# Patient Record
Sex: FEMALE | Race: WHITE | NOT HISPANIC OR LATINO
[De-identification: names, ages, dates, MRNs, and addresses within clinical notes are randomized per-mention and may not be internally consistent; named-entity substitution may affect disease eponyms.]

---

## 2019-05-13 ENCOUNTER — APPOINTMENT (OUTPATIENT)
Dept: ANTEPARTUM | Facility: CLINIC | Age: 29
End: 2019-05-13
Payer: COMMERCIAL

## 2019-05-13 PROCEDURE — 76805 OB US >/= 14 WKS SNGL FETUS: CPT

## 2019-05-13 PROCEDURE — 76817 TRANSVAGINAL US OBSTETRIC: CPT

## 2019-06-18 ENCOUNTER — APPOINTMENT (OUTPATIENT)
Dept: ANTEPARTUM | Facility: CLINIC | Age: 29
End: 2019-06-18
Payer: COMMERCIAL

## 2019-06-18 PROCEDURE — 76817 TRANSVAGINAL US OBSTETRIC: CPT

## 2019-06-18 PROCEDURE — 76816 OB US FOLLOW-UP PER FETUS: CPT

## 2019-08-13 ENCOUNTER — APPOINTMENT (OUTPATIENT)
Dept: ANTEPARTUM | Facility: CLINIC | Age: 29
End: 2019-08-13
Payer: COMMERCIAL

## 2019-08-13 PROCEDURE — 76816 OB US FOLLOW-UP PER FETUS: CPT

## 2019-08-13 PROCEDURE — 76819 FETAL BIOPHYS PROFIL W/O NST: CPT

## 2019-10-01 ENCOUNTER — APPOINTMENT (OUTPATIENT)
Dept: ANTEPARTUM | Facility: CLINIC | Age: 29
End: 2019-10-01

## 2019-10-24 ENCOUNTER — APPOINTMENT (OUTPATIENT)
Dept: ANTEPARTUM | Facility: CLINIC | Age: 29
End: 2019-10-24
Payer: MEDICAID

## 2019-10-24 PROCEDURE — 76819 FETAL BIOPHYS PROFIL W/O NST: CPT

## 2019-10-27 ENCOUNTER — INPATIENT (INPATIENT)
Facility: HOSPITAL | Age: 29
LOS: 1 days | Discharge: HOME | End: 2019-10-29
Attending: OBSTETRICS & GYNECOLOGY | Admitting: OBSTETRICS & GYNECOLOGY
Payer: MEDICAID

## 2019-10-27 VITALS
SYSTOLIC BLOOD PRESSURE: 124 MMHG | RESPIRATION RATE: 18 BRPM | DIASTOLIC BLOOD PRESSURE: 78 MMHG | WEIGHT: 147.71 LBS | HEIGHT: 65 IN | HEART RATE: 75 BPM

## 2019-10-27 DIAGNOSIS — O26.899 OTHER SPECIFIED PREGNANCY RELATED CONDITIONS, UNSPECIFIED TRIMESTER: ICD-10-CM

## 2019-10-27 DIAGNOSIS — Z3A.00 WEEKS OF GESTATION OF PREGNANCY NOT SPECIFIED: ICD-10-CM

## 2019-10-27 LAB
BASOPHILS # BLD AUTO: 0.02 K/UL — SIGNIFICANT CHANGE UP (ref 0–0.2)
BASOPHILS NFR BLD AUTO: 0.2 % — SIGNIFICANT CHANGE UP (ref 0–2)
BLD GP AB SCN SERPL QL: NEGATIVE — SIGNIFICANT CHANGE UP
EOSINOPHIL # BLD AUTO: 0.06 K/UL — SIGNIFICANT CHANGE UP (ref 0–0.5)
EOSINOPHIL NFR BLD AUTO: 0.7 % — SIGNIFICANT CHANGE UP (ref 0–6)
HCT VFR BLD CALC: 35 % — SIGNIFICANT CHANGE UP (ref 34.5–45)
HGB BLD-MCNC: 11.8 G/DL — SIGNIFICANT CHANGE UP (ref 11.5–15.5)
IMM GRANULOCYTES NFR BLD AUTO: 0.6 % — SIGNIFICANT CHANGE UP (ref 0–1.5)
LYMPHOCYTES # BLD AUTO: 1.75 K/UL — SIGNIFICANT CHANGE UP (ref 1–3.3)
LYMPHOCYTES # BLD AUTO: 21.1 % — SIGNIFICANT CHANGE UP (ref 13–44)
MCHC RBC-ENTMCNC: 30 PG — SIGNIFICANT CHANGE UP (ref 27–34)
MCHC RBC-ENTMCNC: 33.7 GM/DL — SIGNIFICANT CHANGE UP (ref 32–36)
MCV RBC AUTO: 89.1 FL — SIGNIFICANT CHANGE UP (ref 80–100)
MONOCYTES # BLD AUTO: 0.52 K/UL — SIGNIFICANT CHANGE UP (ref 0–0.9)
MONOCYTES NFR BLD AUTO: 6.3 % — SIGNIFICANT CHANGE UP (ref 2–14)
NEUTROPHILS # BLD AUTO: 5.88 K/UL — SIGNIFICANT CHANGE UP (ref 1.8–7.4)
NEUTROPHILS NFR BLD AUTO: 71.1 % — SIGNIFICANT CHANGE UP (ref 43–77)
NRBC # BLD: 0 /100 WBCS — SIGNIFICANT CHANGE UP (ref 0–0)
PLATELET # BLD AUTO: 134 K/UL — LOW (ref 150–400)
RBC # BLD: 3.93 M/UL — SIGNIFICANT CHANGE UP (ref 3.8–5.2)
RBC # FLD: 14.6 % — HIGH (ref 10.3–14.5)
RH IG SCN BLD-IMP: POSITIVE — SIGNIFICANT CHANGE UP
RH IG SCN BLD-IMP: POSITIVE — SIGNIFICANT CHANGE UP
WBC # BLD: 8.28 K/UL — SIGNIFICANT CHANGE UP (ref 3.8–10.5)
WBC # FLD AUTO: 8.28 K/UL — SIGNIFICANT CHANGE UP (ref 3.8–10.5)

## 2019-10-27 RX ORDER — IBUPROFEN 200 MG
600 TABLET ORAL EVERY 6 HOURS
Refills: 0 | Status: COMPLETED | OUTPATIENT
Start: 2019-10-27 | End: 2020-09-24

## 2019-10-27 RX ORDER — SODIUM CHLORIDE 9 MG/ML
1000 INJECTION, SOLUTION INTRAVENOUS
Refills: 0 | Status: DISCONTINUED | OUTPATIENT
Start: 2019-10-27 | End: 2019-10-27

## 2019-10-27 RX ORDER — BENZOCAINE 10 %
1 GEL (GRAM) MUCOUS MEMBRANE EVERY 6 HOURS
Refills: 0 | Status: DISCONTINUED | OUTPATIENT
Start: 2019-10-27 | End: 2019-10-29

## 2019-10-27 RX ORDER — KETOROLAC TROMETHAMINE 30 MG/ML
30 SYRINGE (ML) INJECTION ONCE
Refills: 0 | Status: DISCONTINUED | OUTPATIENT
Start: 2019-10-27 | End: 2019-10-27

## 2019-10-27 RX ORDER — OXYTOCIN 10 UNIT/ML
333.33 VIAL (ML) INJECTION
Qty: 20 | Refills: 0 | Status: DISCONTINUED | OUTPATIENT
Start: 2019-10-27 | End: 2019-10-29

## 2019-10-27 RX ORDER — AER TRAVELER 0.5 G/1
1 SOLUTION RECTAL; TOPICAL EVERY 4 HOURS
Refills: 0 | Status: DISCONTINUED | OUTPATIENT
Start: 2019-10-27 | End: 2019-10-29

## 2019-10-27 RX ORDER — PENICILLIN G POTASSIUM 5000000 [IU]/1
POWDER, FOR SOLUTION INTRAMUSCULAR; INTRAPLEURAL; INTRATHECAL; INTRAVENOUS
Refills: 0 | Status: DISCONTINUED | OUTPATIENT
Start: 2019-10-27 | End: 2019-10-27

## 2019-10-27 RX ORDER — OXYCODONE HYDROCHLORIDE 5 MG/1
5 TABLET ORAL ONCE
Refills: 0 | Status: DISCONTINUED | OUTPATIENT
Start: 2019-10-27 | End: 2019-10-29

## 2019-10-27 RX ORDER — LANOLIN
1 OINTMENT (GRAM) TOPICAL EVERY 6 HOURS
Refills: 0 | Status: DISCONTINUED | OUTPATIENT
Start: 2019-10-27 | End: 2019-10-29

## 2019-10-27 RX ORDER — DIBUCAINE 1 %
1 OINTMENT (GRAM) RECTAL EVERY 6 HOURS
Refills: 0 | Status: DISCONTINUED | OUTPATIENT
Start: 2019-10-27 | End: 2019-10-29

## 2019-10-27 RX ORDER — SODIUM CHLORIDE 9 MG/ML
3 INJECTION INTRAMUSCULAR; INTRAVENOUS; SUBCUTANEOUS EVERY 8 HOURS
Refills: 0 | Status: DISCONTINUED | OUTPATIENT
Start: 2019-10-27 | End: 2019-10-29

## 2019-10-27 RX ORDER — IBUPROFEN 200 MG
600 TABLET ORAL EVERY 6 HOURS
Refills: 0 | Status: DISCONTINUED | OUTPATIENT
Start: 2019-10-27 | End: 2019-10-29

## 2019-10-27 RX ORDER — FENTANYL/BUPIVACAINE/NS/PF 2MCG/ML-.1
250 PLASTIC BAG, INJECTION (ML) INJECTION
Refills: 0 | Status: DISCONTINUED | OUTPATIENT
Start: 2019-10-27 | End: 2019-10-28

## 2019-10-27 RX ORDER — OXYCODONE HYDROCHLORIDE 5 MG/1
5 TABLET ORAL
Refills: 0 | Status: DISCONTINUED | OUTPATIENT
Start: 2019-10-27 | End: 2019-10-29

## 2019-10-27 RX ORDER — TETANUS TOXOID, REDUCED DIPHTHERIA TOXOID AND ACELLULAR PERTUSSIS VACCINE, ADSORBED 5; 2.5; 8; 8; 2.5 [IU]/.5ML; [IU]/.5ML; UG/.5ML; UG/.5ML; UG/.5ML
0.5 SUSPENSION INTRAMUSCULAR ONCE
Refills: 0 | Status: DISCONTINUED | OUTPATIENT
Start: 2019-10-27 | End: 2019-10-29

## 2019-10-27 RX ORDER — DIPHENHYDRAMINE HCL 50 MG
25 CAPSULE ORAL EVERY 6 HOURS
Refills: 0 | Status: DISCONTINUED | OUTPATIENT
Start: 2019-10-27 | End: 2019-10-29

## 2019-10-27 RX ORDER — PENICILLIN G POTASSIUM 5000000 [IU]/1
2.5 POWDER, FOR SOLUTION INTRAMUSCULAR; INTRAPLEURAL; INTRATHECAL; INTRAVENOUS EVERY 4 HOURS
Refills: 0 | Status: DISCONTINUED | OUTPATIENT
Start: 2019-10-27 | End: 2019-10-27

## 2019-10-27 RX ORDER — OXYTOCIN 10 UNIT/ML
1 VIAL (ML) INJECTION
Qty: 30 | Refills: 0 | Status: DISCONTINUED | OUTPATIENT
Start: 2019-10-27 | End: 2019-10-27

## 2019-10-27 RX ORDER — ACETAMINOPHEN 500 MG
975 TABLET ORAL
Refills: 0 | Status: DISCONTINUED | OUTPATIENT
Start: 2019-10-27 | End: 2019-10-29

## 2019-10-27 RX ORDER — HYDROCORTISONE 1 %
1 OINTMENT (GRAM) TOPICAL EVERY 6 HOURS
Refills: 0 | Status: DISCONTINUED | OUTPATIENT
Start: 2019-10-27 | End: 2019-10-29

## 2019-10-27 RX ORDER — PRAMOXINE HYDROCHLORIDE 150 MG/15G
1 AEROSOL, FOAM RECTAL EVERY 4 HOURS
Refills: 0 | Status: DISCONTINUED | OUTPATIENT
Start: 2019-10-27 | End: 2019-10-29

## 2019-10-27 RX ORDER — MAGNESIUM HYDROXIDE 400 MG/1
30 TABLET, CHEWABLE ORAL
Refills: 0 | Status: DISCONTINUED | OUTPATIENT
Start: 2019-10-27 | End: 2019-10-29

## 2019-10-27 RX ORDER — GLYCERIN ADULT
1 SUPPOSITORY, RECTAL RECTAL AT BEDTIME
Refills: 0 | Status: DISCONTINUED | OUTPATIENT
Start: 2019-10-27 | End: 2019-10-29

## 2019-10-27 RX ORDER — SIMETHICONE 80 MG/1
80 TABLET, CHEWABLE ORAL EVERY 4 HOURS
Refills: 0 | Status: DISCONTINUED | OUTPATIENT
Start: 2019-10-27 | End: 2019-10-29

## 2019-10-27 RX ORDER — CITRIC ACID/SODIUM CITRATE 300-500 MG
15 SOLUTION, ORAL ORAL EVERY 6 HOURS
Refills: 0 | Status: DISCONTINUED | OUTPATIENT
Start: 2019-10-27 | End: 2019-10-27

## 2019-10-27 RX ORDER — PENICILLIN G POTASSIUM 5000000 [IU]/1
5 POWDER, FOR SOLUTION INTRAMUSCULAR; INTRAPLEURAL; INTRATHECAL; INTRAVENOUS ONCE
Refills: 0 | Status: COMPLETED | OUTPATIENT
Start: 2019-10-27 | End: 2019-10-27

## 2019-10-27 RX ADMIN — Medication 30 MILLIGRAM(S): at 14:15

## 2019-10-27 RX ADMIN — SODIUM CHLORIDE 125 MILLILITER(S): 9 INJECTION, SOLUTION INTRAVENOUS at 11:26

## 2019-10-27 RX ADMIN — Medication 975 MILLIGRAM(S): at 20:46

## 2019-10-27 RX ADMIN — Medication 1 APPLICATION(S): at 20:45

## 2019-10-27 RX ADMIN — SODIUM CHLORIDE 125 MILLILITER(S): 9 INJECTION, SOLUTION INTRAVENOUS at 10:33

## 2019-10-27 RX ADMIN — Medication 1 MILLIUNIT(S)/MIN: at 11:29

## 2019-10-27 RX ADMIN — PENICILLIN G POTASSIUM 100 MILLION UNIT(S): 5000000 POWDER, FOR SOLUTION INTRAMUSCULAR; INTRAPLEURAL; INTRATHECAL; INTRAVENOUS at 10:40

## 2019-10-27 RX ADMIN — Medication 975 MILLIGRAM(S): at 21:26

## 2019-10-27 RX ADMIN — Medication 1000 MILLIUNIT(S)/MIN: at 12:16

## 2019-10-28 ENCOUNTER — TRANSCRIPTION ENCOUNTER (OUTPATIENT)
Age: 29
End: 2019-10-28

## 2019-10-28 LAB — T PALLIDUM AB TITR SER: NEGATIVE — SIGNIFICANT CHANGE UP

## 2019-10-28 RX ORDER — BENZOCAINE 10 %
1 GEL (GRAM) MUCOUS MEMBRANE
Qty: 0 | Refills: 0 | DISCHARGE
Start: 2019-10-28

## 2019-10-28 RX ORDER — IBUPROFEN 200 MG
1 TABLET ORAL
Qty: 0 | Refills: 0 | DISCHARGE
Start: 2019-10-28

## 2019-10-28 RX ORDER — ACETAMINOPHEN 500 MG
3 TABLET ORAL
Qty: 0 | Refills: 0 | DISCHARGE
Start: 2019-10-28

## 2019-10-28 RX ADMIN — Medication 975 MILLIGRAM(S): at 22:34

## 2019-10-28 RX ADMIN — Medication 975 MILLIGRAM(S): at 10:30

## 2019-10-28 RX ADMIN — SODIUM CHLORIDE 3 MILLILITER(S): 9 INJECTION INTRAMUSCULAR; INTRAVENOUS; SUBCUTANEOUS at 22:37

## 2019-10-28 RX ADMIN — Medication 975 MILLIGRAM(S): at 21:26

## 2019-10-28 RX ADMIN — Medication 600 MILLIGRAM(S): at 12:39

## 2019-10-28 RX ADMIN — Medication 975 MILLIGRAM(S): at 09:38

## 2019-10-28 RX ADMIN — Medication 600 MILLIGRAM(S): at 19:40

## 2019-10-28 RX ADMIN — Medication 600 MILLIGRAM(S): at 18:42

## 2019-10-28 RX ADMIN — Medication 1 APPLICATION(S): at 21:25

## 2019-10-28 RX ADMIN — Medication 975 MILLIGRAM(S): at 16:00

## 2019-10-28 RX ADMIN — Medication 1 TABLET(S): at 12:38

## 2019-10-28 RX ADMIN — Medication 1 APPLICATION(S): at 12:38

## 2019-10-28 RX ADMIN — Medication 600 MILLIGRAM(S): at 13:30

## 2019-10-28 RX ADMIN — Medication 1 SPRAY(S): at 12:38

## 2019-10-28 RX ADMIN — Medication 975 MILLIGRAM(S): at 15:08

## 2019-10-28 NOTE — PROGRESS NOTE ADULT - SUBJECTIVE AND OBJECTIVE BOX
Patient evaluated at bedside. No acute events overnight.  She reports pain is well controlled.  She denies heavy vaginal bleeding or perineal discomfort.  She has been ambulating without assistance, voiding spontaneously, and is breastfeeding.    Physical Exam:  T(C): 37.1 (10-28-19 @ 06:14), Max: 37.1 (10-28-19 @ 06:14)  HR: 107 (10-28-19 @ 06:14) (92 - 107)  BP: 115/72 (10-28-19 @ 06:14) (112/73 - 115/72)  RR: 17 (10-28-19 @ 06:14) (17 - 18)  SpO2: 99% (10-28-19 @ 06:14) (98% - 99%)  Wt(kg): --    General: resting comfortable in NAD  Respiratory: no increased work of breathing  Abdomen: soft, nontender, nondistended, no rebound or guarding, uterus firm at midline, fundus below umbilicus  Genitourinary: lochia WNL  Extremities: no swelling or calf tenderness                          11.8   8.28  )-----------( 134      ( 27 Oct 2019 10:57 )             35.0

## 2019-10-28 NOTE — DISCHARGE NOTE OB - PATIENT PORTAL LINK FT
You can access the FollowMyHealth Patient Portal offered by [facility] by registering at the following website: [email or web address]. By joining [employer]’s FollowMyHealth portal, you will also be able to view your health information using other applications (apps) compatible with our system.

## 2019-10-28 NOTE — LACTATION INITIAL EVALUATION - NS LACT CON REASON FOR REQ
primaparous mom/28hr old  4.6% wt loss 5voids/4stools since birth (breast only). SGA chems stable. mother reports baby has been latching well, but she has nipple pain. observed mother offer breast in a cradle hold with baby dressed and swaddled. encouraged s2s, mother declined. readjusted positioning to a cross cradle hold and with minimal assistance baby latched deeply and sustained for the duration of the consult with slow, rhythmic suck and visible swallows. swallowing also observed by mother. mother reports increased comfort. reviewed bfing basics, positioning techniques, feeding/satiety cues, signs of good latch, and encouraged s2s contact. reviewed nipple care. advised responsive feeding 8-12x/day.

## 2019-10-28 NOTE — DISCHARGE NOTE OB - CARE PROVIDER_API CALL
Jason Lawton)  Obstetrics and Gynecology  [address] [place], [place] [address]  Phone: [phone]  Fax: [phone]  Follow Up Time:

## 2019-10-28 NOTE — PROGRESS NOTE ADULT - ASSESSMENT
A/P: 29y s/p , PPD #1 , stable, meeting postpartum milestones   - Pain: well controlled on tylenol  - GI: Tolerating regular diet  - : urinating without difficulty/pain  - DVT prophylaxis: ambulating frequently  - Dispo: PPD 2, unless otherwise specified

## 2019-10-29 VITALS
RESPIRATION RATE: 18 BRPM | SYSTOLIC BLOOD PRESSURE: 124 MMHG | HEART RATE: 84 BPM | TEMPERATURE: 98 F | DIASTOLIC BLOOD PRESSURE: 85 MMHG | OXYGEN SATURATION: 100 %

## 2019-10-29 PROCEDURE — 86901 BLOOD TYPING SEROLOGIC RH(D): CPT

## 2019-10-29 PROCEDURE — 86900 BLOOD TYPING SEROLOGIC ABO: CPT

## 2019-10-29 PROCEDURE — 86850 RBC ANTIBODY SCREEN: CPT

## 2019-10-29 PROCEDURE — 99214 OFFICE O/P EST MOD 30 MIN: CPT

## 2019-10-29 PROCEDURE — 86780 TREPONEMA PALLIDUM: CPT

## 2019-10-29 PROCEDURE — 85025 COMPLETE CBC W/AUTO DIFF WBC: CPT

## 2019-10-29 PROCEDURE — 36415 COLL VENOUS BLD VENIPUNCTURE: CPT

## 2019-10-29 RX ADMIN — Medication 600 MILLIGRAM(S): at 06:16

## 2019-10-29 RX ADMIN — Medication 1 TABLET(S): at 11:46

## 2019-10-29 RX ADMIN — Medication 600 MILLIGRAM(S): at 11:46

## 2019-10-29 RX ADMIN — Medication 975 MILLIGRAM(S): at 09:36

## 2019-10-29 RX ADMIN — Medication 975 MILLIGRAM(S): at 10:20

## 2019-10-29 RX ADMIN — Medication 600 MILLIGRAM(S): at 02:16

## 2019-10-29 RX ADMIN — Medication 600 MILLIGRAM(S): at 01:06

## 2019-10-29 RX ADMIN — Medication 600 MILLIGRAM(S): at 12:40

## 2019-10-29 NOTE — PROGRESS NOTE ADULT - SUBJECTIVE AND OBJECTIVE BOX
Patient evaluated at bedside this morning, resting comfortable in bed, no acute events overnight.  She reports pain is well controlled with tylenol and motrin  She denies headache, dizziness, chest pain, palpitations, shortness of breath, nausea, vomiting, heavy vaginal bleeding or perineal discomfort. Reports decrease in amount of vaginal bleeding and denies clots.  She has been ambulating without assistance, voiding spontaneously, and is breastfeeding.   Tolerating food well, without nausea/vomit.  Passing flatus.     Physical Exam:  T(C): 36.7 (10-29-19 @ 06:05), Max: 36.7 (10-29-19 @ 06:05)  HR: 78 (10-29-19 @ 06:05) (78 - 78)  BP: 119/76 (10-29-19 @ 06:05) (119/76 - 119/76)  RR: 18 (10-29-19 @ 06:05) (18 - 18)  SpO2: 100% (10-29-19 @ 06:05) (100% - 100%)    GA: NAD, A&O x 3  CV: RRR, no murmurs, rubs, or gallops  Pulm: clear breath sounds throughout, no rales, rhonchi, wheezes  Abd: + BS, soft, nontender, nondistended, no rebound or guarding, uterus firm at midline and 2  fb below umbilicus  Perineum: normal lochia, intact, healing well, no hematoma  Extremities: no swelling or calf tenderness                          11.8   8.28  )-----------( 134      ( 27 Oct 2019 10:57 )             35.0           acetaminophen   Tablet .. 975 milliGRAM(s) Oral <User Schedule>  benzocaine 20%/menthol 0.5% Spray 1 Spray(s) Topical every 6 hours PRN  dibucaine 1% Ointment 1 Application(s) Topical every 6 hours PRN  diphenhydrAMINE 25 milliGRAM(s) Oral every 6 hours PRN  diphtheria/tetanus/pertussis (acellular) Vaccine (ADAcel) 0.5 milliLiter(s) IntraMuscular once  glycerin Suppository - Adult 1 Suppository(s) Rectal at bedtime PRN  hydrocortisone 1% Cream 1 Application(s) Topical every 6 hours PRN  ibuprofen  Tablet. 600 milliGRAM(s) Oral every 6 hours  lanolin Ointment 1 Application(s) Topical every 6 hours PRN  magnesium hydroxide Suspension 30 milliLiter(s) Oral two times a day PRN  oxyCODONE    IR 5 milliGRAM(s) Oral every 3 hours PRN  oxyCODONE    IR 5 milliGRAM(s) Oral once PRN  oxytocin Infusion 333.333 milliUNIT(s)/Min IV Continuous <Continuous>  pramoxine 1%/zinc 5% Cream 1 Application(s) Topical every 4 hours PRN  prenatal multivitamin 1 Tablet(s) Oral daily  simethicone 80 milliGRAM(s) Chew every 4 hours PRN  sodium chloride 0.9% lock flush 3 milliLiter(s) IV Push every 8 hours  witch hazel Pads 1 Application(s) Topical every 4 hours PRN

## 2019-10-29 NOTE — PROGRESS NOTE ADULT - ASSESSMENT
A/P 29y s/p , PPD #2  , stable, meeting postpartum milestones   - Pain: well controlled on tylenol and motrin  - GI: Tolerating regular diet, colace PRN  - : urinating without difficulty/pain  -DVT prophylaxis: ambulating frequently  -Dispo: discharge today, PPD 2, unless otherwise specified

## 2019-11-01 DIAGNOSIS — Z3A.40 40 WEEKS GESTATION OF PREGNANCY: ICD-10-CM

## 2019-11-01 DIAGNOSIS — Z34.83 ENCOUNTER FOR SUPERVISION OF OTHER NORMAL PREGNANCY, THIRD TRIMESTER: ICD-10-CM

## 2020-09-03 ENCOUNTER — APPOINTMENT (OUTPATIENT)
Dept: OBGYN | Facility: CLINIC | Age: 30
End: 2020-09-03
Payer: MEDICAID

## 2020-09-03 DIAGNOSIS — Z00.00 ENCOUNTER FOR GENERAL ADULT MEDICAL EXAMINATION W/OUT ABNORMAL FINDINGS: ICD-10-CM

## 2020-09-03 PROCEDURE — 0500F INITIAL PRENATAL CARE VISIT: CPT

## 2020-09-03 PROCEDURE — 36415 COLL VENOUS BLD VENIPUNCTURE: CPT

## 2020-09-03 RX ORDER — VITAMIN A, ASCORBIC ACID, CHOLECALCIFEROL, TOCOPHEROL, THIAMINE MONONITRATE, RIBOFLAVIN, PYRIDOXINE, FOLIC ACID, CYANOCOBALAMIN, CALCIUM CARBONATE, FERROUS FUMARATE, ZINC OXIDE, CUPRIC OXIDE, NIACINAMIDE, AND FISH OIL 27-1-250MG
27-1 & 312 KIT ORAL
Qty: 90 | Refills: 2 | Status: ACTIVE | COMMUNITY
Start: 2020-09-03 | End: 1900-01-01

## 2020-09-04 LAB
ABO + RH PNL BLD: NORMAL
BASOPHILS # BLD AUTO: 0.03 K/UL
BASOPHILS NFR BLD AUTO: 0.4 %
BLD GP AB SCN SERPL QL: NORMAL
EOSINOPHIL # BLD AUTO: 0.16 K/UL
EOSINOPHIL NFR BLD AUTO: 2.1 %
HBV SURFACE AG SER QL: NONREACTIVE
HCT VFR BLD CALC: 36.7 %
HCV AB SER QL: NONREACTIVE
HCV S/CO RATIO: 0.15 S/CO
HGB A MFR BLD: 97.7 %
HGB A2 MFR BLD: 2.3 %
HGB BLD-MCNC: 11.1 G/DL
HGB FRACT BLD-IMP: NORMAL
HIV1+2 AB SPEC QL IA.RAPID: NONREACTIVE
IMM GRANULOCYTES NFR BLD AUTO: 0.3 %
LYMPHOCYTES # BLD AUTO: 1.82 K/UL
LYMPHOCYTES NFR BLD AUTO: 23.6 %
MAN DIFF?: NORMAL
MCHC RBC-ENTMCNC: 28.1 PG
MCHC RBC-ENTMCNC: 30.2 GM/DL
MCV RBC AUTO: 92.9 FL
MONOCYTES # BLD AUTO: 0.5 K/UL
MONOCYTES NFR BLD AUTO: 6.5 %
NEUTROPHILS # BLD AUTO: 5.17 K/UL
NEUTROPHILS NFR BLD AUTO: 67.1 %
PLATELET # BLD AUTO: 194 K/UL
RBC # BLD: 3.95 M/UL
RBC # FLD: 13.7 %
T PALLIDUM AB SER QL IA: NEGATIVE
TSH SERPL-ACNC: 4.87 UIU/ML
WBC # FLD AUTO: 7.7 K/UL

## 2020-09-05 LAB
C TRACH RRNA SPEC QL NAA+PROBE: NOT DETECTED
CMV IGG SERPL QL: >10 U/ML
CMV IGG SERPL-IMP: POSITIVE
CMV IGM SERPL QL: <8 AU/ML
CMV IGM SERPL QL: NEGATIVE
HSV 1+2 IGG SER IA-IMP: ABNORMAL
HSV 1+2 IGG SER IA-IMP: NEGATIVE
HSV1 IGG SER QL: 0.13 INDEX
HSV2 IGG SER QL: 0.96 INDEX
MEV IGG FLD QL IA: >300 AU/ML
MEV IGG+IGM SER-IMP: POSITIVE
N GONORRHOEA RRNA SPEC QL NAA+PROBE: NOT DETECTED
RUBV IGG FLD-ACNC: 2 INDEX
RUBV IGG SER-IMP: POSITIVE
SOURCE AMPLIFICATION: NORMAL
T GONDII AB SER-IMP: NEGATIVE
T GONDII AB SER-IMP: NEGATIVE
T GONDII IGG SER QL: <3 IU/ML
T GONDII IGM SER QL: <3 AU/ML
VZV AB TITR SER: POSITIVE
VZV IGG SER IF-ACNC: 2446 INDEX

## 2020-09-08 PROBLEM — Z00.00 ENCOUNTER FOR PREVENTIVE HEALTH EXAMINATION: Status: ACTIVE | Noted: 2019-04-08

## 2020-09-08 LAB
BACTERIA UR CULT: NORMAL
MEV IGM SER QL: NEGATIVE
RUBV IGM FLD-ACNC: <20 AU/ML
VZV IGM SER IF-ACNC: <0.91 INDEX

## 2020-09-09 LAB
HSV1 IGM SER QL: NORMAL TITER
HSV2 AB FLD-ACNC: NORMAL TITER

## 2020-09-10 LAB
B19V IGG SER QL IA: 5.8 INDEX
B19V IGG+IGM SER-IMP: NORMAL
B19V IGG+IGM SER-IMP: POSITIVE
B19V IGM FLD-ACNC: 0.1
B19V IGM SER-ACNC: NEGATIVE
CYTOLOGY CVX/VAG DOC THIN PREP: ABNORMAL

## 2020-09-17 ENCOUNTER — APPOINTMENT (OUTPATIENT)
Dept: OBGYN | Facility: CLINIC | Age: 30
End: 2020-09-17
Payer: MEDICAID

## 2020-09-17 VITALS — SYSTOLIC BLOOD PRESSURE: 110 MMHG | WEIGHT: 143 LBS | DIASTOLIC BLOOD PRESSURE: 70 MMHG

## 2020-09-17 PROCEDURE — 36415 COLL VENOUS BLD VENIPUNCTURE: CPT

## 2020-09-17 PROCEDURE — 0502F SUBSEQUENT PRENATAL CARE: CPT

## 2020-09-21 LAB
HSV 1+2 IGG SER IA-IMP: NEGATIVE
HSV 1+2 IGG SER IA-IMP: NEGATIVE
HSV1 IGG SER QL: 0.12 INDEX
HSV2 IGG SER QL: 0.76 INDEX

## 2020-10-01 ENCOUNTER — APPOINTMENT (OUTPATIENT)
Dept: OBGYN | Facility: CLINIC | Age: 30
End: 2020-10-01
Payer: MEDICAID

## 2020-10-01 ENCOUNTER — NON-APPOINTMENT (OUTPATIENT)
Age: 30
End: 2020-10-01

## 2020-10-01 VITALS
WEIGHT: 143 LBS | SYSTOLIC BLOOD PRESSURE: 100 MMHG | HEIGHT: 65 IN | BODY MASS INDEX: 23.82 KG/M2 | DIASTOLIC BLOOD PRESSURE: 60 MMHG

## 2020-10-01 PROCEDURE — 36415 COLL VENOUS BLD VENIPUNCTURE: CPT

## 2020-10-01 PROCEDURE — 0502F SUBSEQUENT PRENATAL CARE: CPT

## 2020-10-05 LAB — TSH SERPL-ACNC: 1.48 UIU/ML

## 2020-10-15 ENCOUNTER — APPOINTMENT (OUTPATIENT)
Dept: ANTEPARTUM | Facility: CLINIC | Age: 30
End: 2020-10-15
Payer: MEDICAID

## 2020-10-15 PROCEDURE — 76813 OB US NUCHAL MEAS 1 GEST: CPT

## 2020-10-15 PROCEDURE — 76801 OB US < 14 WKS SINGLE FETUS: CPT

## 2020-11-05 ENCOUNTER — APPOINTMENT (OUTPATIENT)
Dept: OBGYN | Facility: CLINIC | Age: 30
End: 2020-11-05
Payer: MEDICAID

## 2020-11-05 VITALS
SYSTOLIC BLOOD PRESSURE: 120 MMHG | DIASTOLIC BLOOD PRESSURE: 60 MMHG | WEIGHT: 142 LBS | HEIGHT: 65 IN | BODY MASS INDEX: 23.66 KG/M2

## 2020-11-05 PROCEDURE — 36415 COLL VENOUS BLD VENIPUNCTURE: CPT

## 2020-11-05 PROCEDURE — 0502F SUBSEQUENT PRENATAL CARE: CPT

## 2020-11-05 PROCEDURE — 59425 ANTEPARTUM CARE ONLY: CPT

## 2020-11-11 LAB
1ST TRIMESTER DATA: NORMAL
2ND TRIMESTER DATA: NORMAL
ADDENDUM DOC: NORMAL
AFP PNL SERPL: NORMAL
AFP SERPL-ACNC: NORMAL
AFP SERPL-ACNC: NORMAL
B-HCG FREE SERPL-MCNC: NORMAL
CLINICAL BIOCHEMIST REVIEW: NORMAL
FREE BETA HCG 1ST TRIMESTER: NORMAL
INHIBIN A SERPL-MCNC: NORMAL
NASAL BONE: PRESENT
NOTES NTD: NORMAL
NT: NORMAL
PAPP-A SERPL-ACNC: NORMAL
U ESTRIOL SERPL-SCNC: NORMAL

## 2020-11-16 ENCOUNTER — APPOINTMENT (OUTPATIENT)
Dept: ANTEPARTUM | Facility: CLINIC | Age: 30
End: 2020-11-16
Payer: MEDICAID

## 2020-11-16 PROCEDURE — 76805 OB US >/= 14 WKS SNGL FETUS: CPT

## 2020-11-16 PROCEDURE — 99072 ADDL SUPL MATRL&STAF TM PHE: CPT

## 2020-11-16 PROCEDURE — 76817 TRANSVAGINAL US OBSTETRIC: CPT

## 2020-12-14 ENCOUNTER — APPOINTMENT (OUTPATIENT)
Dept: ANTEPARTUM | Facility: CLINIC | Age: 30
End: 2020-12-14
Payer: MEDICAID

## 2020-12-14 ENCOUNTER — APPOINTMENT (OUTPATIENT)
Dept: OBGYN | Facility: CLINIC | Age: 30
End: 2020-12-14
Payer: MEDICAID

## 2020-12-14 VITALS
DIASTOLIC BLOOD PRESSURE: 60 MMHG | HEIGHT: 65 IN | WEIGHT: 143 LBS | BODY MASS INDEX: 23.82 KG/M2 | SYSTOLIC BLOOD PRESSURE: 120 MMHG

## 2020-12-14 PROCEDURE — 99213 OFFICE O/P EST LOW 20 MIN: CPT

## 2020-12-14 PROCEDURE — 76817 TRANSVAGINAL US OBSTETRIC: CPT

## 2020-12-14 PROCEDURE — 76816 OB US FOLLOW-UP PER FETUS: CPT

## 2021-01-11 ENCOUNTER — NON-APPOINTMENT (OUTPATIENT)
Age: 31
End: 2021-01-11

## 2021-01-11 ENCOUNTER — APPOINTMENT (OUTPATIENT)
Dept: OBGYN | Facility: CLINIC | Age: 31
End: 2021-01-11
Payer: MEDICAID

## 2021-01-11 ENCOUNTER — APPOINTMENT (OUTPATIENT)
Dept: ANTEPARTUM | Facility: CLINIC | Age: 31
End: 2021-01-11
Payer: MEDICAID

## 2021-01-11 VITALS — DIASTOLIC BLOOD PRESSURE: 60 MMHG | WEIGHT: 149 LBS | BODY MASS INDEX: 24.79 KG/M2 | SYSTOLIC BLOOD PRESSURE: 90 MMHG

## 2021-01-11 PROCEDURE — 99072 ADDL SUPL MATRL&STAF TM PHE: CPT

## 2021-01-11 PROCEDURE — 76816 OB US FOLLOW-UP PER FETUS: CPT

## 2021-01-11 PROCEDURE — 76819 FETAL BIOPHYS PROFIL W/O NST: CPT

## 2021-01-11 PROCEDURE — 0502F SUBSEQUENT PRENATAL CARE: CPT

## 2021-01-15 DIAGNOSIS — D64.9 ANEMIA, UNSPECIFIED: ICD-10-CM

## 2021-01-15 LAB
BACTERIA UR CULT: NORMAL
BASOPHILS # BLD AUTO: 0.03 K/UL
BASOPHILS NFR BLD AUTO: 0.3 %
EOSINOPHIL # BLD AUTO: 0.25 K/UL
EOSINOPHIL NFR BLD AUTO: 2.6 %
ESTIMATED AVERAGE GLUCOSE: 88 MG/DL
GLUCOSE 1H P 50 G GLC PO SERPL-MCNC: 123 MG/DL
HBA1C MFR BLD HPLC: 4.7 %
HCT VFR BLD CALC: 30.9 %
HGB BLD-MCNC: 9.5 G/DL
IMM GRANULOCYTES NFR BLD AUTO: 1.3 %
LYMPHOCYTES # BLD AUTO: 1.61 K/UL
LYMPHOCYTES NFR BLD AUTO: 16.6 %
MAN DIFF?: NORMAL
MCHC RBC-ENTMCNC: 28.7 PG
MCHC RBC-ENTMCNC: 30.7 GM/DL
MCV RBC AUTO: 93.4 FL
MONOCYTES # BLD AUTO: 0.58 K/UL
MONOCYTES NFR BLD AUTO: 6 %
NEUTROPHILS # BLD AUTO: 7.12 K/UL
NEUTROPHILS NFR BLD AUTO: 73.2 %
PLATELET # BLD AUTO: 176 K/UL
RBC # BLD: 3.31 M/UL
RBC # FLD: 15 %
T PALLIDUM AB SER QL IA: NEGATIVE
WBC # FLD AUTO: 9.72 K/UL

## 2021-02-16 ENCOUNTER — APPOINTMENT (OUTPATIENT)
Dept: OBGYN | Facility: CLINIC | Age: 31
End: 2021-02-16

## 2021-02-16 ENCOUNTER — ASOB RESULT (OUTPATIENT)
Age: 31
End: 2021-02-16

## 2021-02-16 ENCOUNTER — APPOINTMENT (OUTPATIENT)
Dept: ANTEPARTUM | Facility: CLINIC | Age: 31
End: 2021-02-16
Payer: MEDICAID

## 2021-02-16 PROCEDURE — 76819 FETAL BIOPHYS PROFIL W/O NST: CPT

## 2021-02-16 PROCEDURE — 76816 OB US FOLLOW-UP PER FETUS: CPT

## 2021-02-16 PROCEDURE — 99072 ADDL SUPL MATRL&STAF TM PHE: CPT

## 2021-03-23 ENCOUNTER — ASOB RESULT (OUTPATIENT)
Age: 31
End: 2021-03-23

## 2021-03-23 ENCOUNTER — NON-APPOINTMENT (OUTPATIENT)
Age: 31
End: 2021-03-23

## 2021-03-23 ENCOUNTER — APPOINTMENT (OUTPATIENT)
Dept: OBGYN | Facility: CLINIC | Age: 31
End: 2021-03-23
Payer: MEDICAID

## 2021-03-23 ENCOUNTER — APPOINTMENT (OUTPATIENT)
Dept: ANTEPARTUM | Facility: CLINIC | Age: 31
End: 2021-03-23
Payer: MEDICAID

## 2021-03-23 VITALS — DIASTOLIC BLOOD PRESSURE: 60 MMHG | SYSTOLIC BLOOD PRESSURE: 100 MMHG | BODY MASS INDEX: 26.29 KG/M2 | WEIGHT: 158 LBS

## 2021-03-23 PROCEDURE — 0502F SUBSEQUENT PRENATAL CARE: CPT

## 2021-03-23 PROCEDURE — 76819 FETAL BIOPHYS PROFIL W/O NST: CPT

## 2021-03-23 PROCEDURE — 99072 ADDL SUPL MATRL&STAF TM PHE: CPT

## 2021-03-25 LAB
GP B STREP DNA SPEC QL NAA+PROBE: DETECTED
GP B STREP DNA SPEC QL NAA+PROBE: NORMAL
SOURCE GBS: NORMAL

## 2021-04-07 ENCOUNTER — NON-APPOINTMENT (OUTPATIENT)
Age: 31
End: 2021-04-07

## 2021-04-07 ENCOUNTER — APPOINTMENT (OUTPATIENT)
Dept: OBGYN | Facility: CLINIC | Age: 31
End: 2021-04-07
Payer: MEDICAID

## 2021-04-07 VITALS
DIASTOLIC BLOOD PRESSURE: 70 MMHG | HEIGHT: 65 IN | BODY MASS INDEX: 25.99 KG/M2 | SYSTOLIC BLOOD PRESSURE: 102 MMHG | WEIGHT: 156 LBS

## 2021-04-07 PROCEDURE — 36415 COLL VENOUS BLD VENIPUNCTURE: CPT

## 2021-04-07 PROCEDURE — 0502F SUBSEQUENT PRENATAL CARE: CPT

## 2021-04-07 RX ORDER — CHLORHEXIDINE GLUCONATE 4 %
325 (65 FE) LIQUID (ML) TOPICAL TWICE DAILY
Qty: 60 | Refills: 2 | Status: ACTIVE | COMMUNITY
Start: 2021-04-07 | End: 1900-01-01

## 2021-04-08 LAB
BASOPHILS # BLD AUTO: 0.02 K/UL
BASOPHILS NFR BLD AUTO: 0.2 %
EOSINOPHIL # BLD AUTO: 0.17 K/UL
EOSINOPHIL NFR BLD AUTO: 1.8 %
HCT VFR BLD CALC: 33.4 %
HGB BLD-MCNC: 10.3 G/DL
HSV 1+2 IGG SER IA-IMP: NEGATIVE
HSV 1+2 IGG SER IA-IMP: NEGATIVE
HSV1 IGG SER QL: 0.02 INDEX
HSV2 IGG SER QL: 0.34 INDEX
IMM GRANULOCYTES NFR BLD AUTO: 1 %
LYMPHOCYTES # BLD AUTO: 1.81 K/UL
LYMPHOCYTES NFR BLD AUTO: 18.7 %
MAN DIFF?: NORMAL
MCHC RBC-ENTMCNC: 27 PG
MCHC RBC-ENTMCNC: 30.8 GM/DL
MCV RBC AUTO: 87.7 FL
MONOCYTES # BLD AUTO: 0.68 K/UL
MONOCYTES NFR BLD AUTO: 7 %
NEUTROPHILS # BLD AUTO: 6.92 K/UL
NEUTROPHILS NFR BLD AUTO: 71.3 %
PLATELET # BLD AUTO: 146 K/UL
RBC # BLD: 3.81 M/UL
RBC # FLD: 15.7 %
WBC # FLD AUTO: 9.7 K/UL

## 2021-04-15 ENCOUNTER — APPOINTMENT (OUTPATIENT)
Dept: OBGYN | Facility: CLINIC | Age: 31
End: 2021-04-15
Payer: MEDICAID

## 2021-04-15 ENCOUNTER — NON-APPOINTMENT (OUTPATIENT)
Age: 31
End: 2021-04-15

## 2021-04-15 VITALS — WEIGHT: 157 LBS | SYSTOLIC BLOOD PRESSURE: 110 MMHG | BODY MASS INDEX: 26.13 KG/M2 | DIASTOLIC BLOOD PRESSURE: 70 MMHG

## 2021-04-15 LAB
HSV1 IGM SER QL: NORMAL TITER
HSV2 AB FLD-ACNC: NORMAL TITER

## 2021-04-15 PROCEDURE — 0502F SUBSEQUENT PRENATAL CARE: CPT

## 2021-04-22 ENCOUNTER — NON-APPOINTMENT (OUTPATIENT)
Age: 31
End: 2021-04-22

## 2021-04-22 ENCOUNTER — TRANSCRIPTION ENCOUNTER (OUTPATIENT)
Age: 31
End: 2021-04-22

## 2021-04-22 ENCOUNTER — APPOINTMENT (OUTPATIENT)
Dept: OBGYN | Facility: CLINIC | Age: 31
End: 2021-04-22
Payer: MEDICAID

## 2021-04-22 VITALS — DIASTOLIC BLOOD PRESSURE: 70 MMHG | BODY MASS INDEX: 26.46 KG/M2 | WEIGHT: 159 LBS | SYSTOLIC BLOOD PRESSURE: 110 MMHG

## 2021-04-22 PROCEDURE — 0502F SUBSEQUENT PRENATAL CARE: CPT

## 2021-04-22 PROCEDURE — 59425 ANTEPARTUM CARE ONLY: CPT

## 2021-04-23 ENCOUNTER — RESULT REVIEW (OUTPATIENT)
Age: 31
End: 2021-04-23

## 2021-04-23 ENCOUNTER — INPATIENT (INPATIENT)
Facility: HOSPITAL | Age: 31
LOS: 1 days | Discharge: HOME | End: 2021-04-25
Attending: OBSTETRICS & GYNECOLOGY | Admitting: OBSTETRICS & GYNECOLOGY
Payer: MEDICAID

## 2021-04-23 VITALS — HEIGHT: 65 IN | WEIGHT: 158.73 LBS

## 2021-04-23 LAB
ALBUMIN SERPL ELPH-MCNC: 3.5 G/DL — SIGNIFICANT CHANGE UP (ref 3.3–5)
ALP SERPL-CCNC: 235 U/L — HIGH (ref 40–120)
ALT FLD-CCNC: 8 U/L — LOW (ref 10–45)
ANION GAP SERPL CALC-SCNC: 12 MMOL/L — SIGNIFICANT CHANGE UP (ref 5–17)
APTT BLD: 27.7 SEC — SIGNIFICANT CHANGE UP (ref 27.5–35.5)
APTT BLD: 29.5 SEC — SIGNIFICANT CHANGE UP (ref 27.5–35.5)
AST SERPL-CCNC: 20 U/L — SIGNIFICANT CHANGE UP (ref 10–40)
BASOPHILS # BLD AUTO: 0.01 K/UL — SIGNIFICANT CHANGE UP (ref 0–0.2)
BASOPHILS # BLD AUTO: 0.02 K/UL — SIGNIFICANT CHANGE UP (ref 0–0.2)
BASOPHILS NFR BLD AUTO: 0.1 % — SIGNIFICANT CHANGE UP (ref 0–2)
BASOPHILS NFR BLD AUTO: 0.2 % — SIGNIFICANT CHANGE UP (ref 0–2)
BILIRUB SERPL-MCNC: 0.5 MG/DL — SIGNIFICANT CHANGE UP (ref 0.2–1.2)
BLD GP AB SCN SERPL QL: NEGATIVE — SIGNIFICANT CHANGE UP
BUN SERPL-MCNC: 7 MG/DL — SIGNIFICANT CHANGE UP (ref 7–23)
CALCIUM SERPL-MCNC: 8.6 MG/DL — SIGNIFICANT CHANGE UP (ref 8.4–10.5)
CHLORIDE SERPL-SCNC: 105 MMOL/L — SIGNIFICANT CHANGE UP (ref 96–108)
CO2 SERPL-SCNC: 21 MMOL/L — LOW (ref 22–31)
COVID-19 SPIKE DOMAIN AB INTERP: NEGATIVE — SIGNIFICANT CHANGE UP
COVID-19 SPIKE DOMAIN ANTIBODY RESULT: 0.4 U/ML — SIGNIFICANT CHANGE UP
CREAT ?TM UR-MCNC: 54 MG/DL — SIGNIFICANT CHANGE UP
CREAT SERPL-MCNC: 0.44 MG/DL — LOW (ref 0.5–1.3)
EOSINOPHIL # BLD AUTO: 0.02 K/UL — SIGNIFICANT CHANGE UP (ref 0–0.5)
EOSINOPHIL # BLD AUTO: 0.07 K/UL — SIGNIFICANT CHANGE UP (ref 0–0.5)
EOSINOPHIL NFR BLD AUTO: 0.2 % — SIGNIFICANT CHANGE UP (ref 0–6)
EOSINOPHIL NFR BLD AUTO: 0.8 % — SIGNIFICANT CHANGE UP (ref 0–6)
FIBRINOGEN PPP-MCNC: 349 MG/DL — SIGNIFICANT CHANGE UP (ref 258–438)
FIBRINOGEN PPP-MCNC: 470 MG/DL — HIGH (ref 258–438)
GLUCOSE SERPL-MCNC: 88 MG/DL — SIGNIFICANT CHANGE UP (ref 70–99)
HCT VFR BLD CALC: 27.4 % — LOW (ref 34.5–45)
HCT VFR BLD CALC: 34.4 % — LOW (ref 34.5–45)
HGB BLD-MCNC: 11.1 G/DL — LOW (ref 11.5–15.5)
HGB BLD-MCNC: 8.9 G/DL — LOW (ref 11.5–15.5)
IMM GRANULOCYTES NFR BLD AUTO: 0.6 % — SIGNIFICANT CHANGE UP (ref 0–1.5)
IMM GRANULOCYTES NFR BLD AUTO: 1 % — SIGNIFICANT CHANGE UP (ref 0–1.5)
INR BLD: 1.03 — SIGNIFICANT CHANGE UP (ref 0.88–1.16)
INR BLD: 1.04 — SIGNIFICANT CHANGE UP (ref 0.88–1.16)
LDH SERPL L TO P-CCNC: 155 U/L — SIGNIFICANT CHANGE UP (ref 50–242)
LYMPHOCYTES # BLD AUTO: 1.58 K/UL — SIGNIFICANT CHANGE UP (ref 1–3.3)
LYMPHOCYTES # BLD AUTO: 1.63 K/UL — SIGNIFICANT CHANGE UP (ref 1–3.3)
LYMPHOCYTES # BLD AUTO: 13.2 % — SIGNIFICANT CHANGE UP (ref 13–44)
LYMPHOCYTES # BLD AUTO: 17.9 % — SIGNIFICANT CHANGE UP (ref 13–44)
MCHC RBC-ENTMCNC: 26.6 PG — LOW (ref 27–34)
MCHC RBC-ENTMCNC: 27.4 PG — SIGNIFICANT CHANGE UP (ref 27–34)
MCHC RBC-ENTMCNC: 32.3 GM/DL — SIGNIFICANT CHANGE UP (ref 32–36)
MCHC RBC-ENTMCNC: 32.5 GM/DL — SIGNIFICANT CHANGE UP (ref 32–36)
MCV RBC AUTO: 82.5 FL — SIGNIFICANT CHANGE UP (ref 80–100)
MCV RBC AUTO: 84.3 FL — SIGNIFICANT CHANGE UP (ref 80–100)
MONOCYTES # BLD AUTO: 0.5 K/UL — SIGNIFICANT CHANGE UP (ref 0–0.9)
MONOCYTES # BLD AUTO: 0.7 K/UL — SIGNIFICANT CHANGE UP (ref 0–0.9)
MONOCYTES NFR BLD AUTO: 5.5 % — SIGNIFICANT CHANGE UP (ref 2–14)
MONOCYTES NFR BLD AUTO: 5.8 % — SIGNIFICANT CHANGE UP (ref 2–14)
NEUTROPHILS # BLD AUTO: 6.78 K/UL — SIGNIFICANT CHANGE UP (ref 1.8–7.4)
NEUTROPHILS # BLD AUTO: 9.61 K/UL — HIGH (ref 1.8–7.4)
NEUTROPHILS NFR BLD AUTO: 74.6 % — SIGNIFICANT CHANGE UP (ref 43–77)
NEUTROPHILS NFR BLD AUTO: 80.1 % — HIGH (ref 43–77)
NRBC # BLD: 0 /100 WBCS — SIGNIFICANT CHANGE UP (ref 0–0)
NRBC # BLD: 0 /100 WBCS — SIGNIFICANT CHANGE UP (ref 0–0)
PLATELET # BLD AUTO: 117 K/UL — LOW (ref 150–400)
PLATELET # BLD AUTO: 147 K/UL — LOW (ref 150–400)
POTASSIUM SERPL-MCNC: 3.7 MMOL/L — SIGNIFICANT CHANGE UP (ref 3.5–5.3)
POTASSIUM SERPL-SCNC: 3.7 MMOL/L — SIGNIFICANT CHANGE UP (ref 3.5–5.3)
PROT ?TM UR-MCNC: 18 MG/DL — HIGH (ref 0–12)
PROT SERPL-MCNC: 7.6 G/DL — SIGNIFICANT CHANGE UP (ref 6–8.3)
PROT/CREAT UR-RTO: 0.3 RATIO — HIGH (ref 0–0.2)
PROTHROM AB SERPL-ACNC: 12.3 SEC — SIGNIFICANT CHANGE UP (ref 10.6–13.6)
PROTHROM AB SERPL-ACNC: 12.5 SEC — SIGNIFICANT CHANGE UP (ref 10.6–13.6)
RBC # BLD: 3.25 M/UL — LOW (ref 3.8–5.2)
RBC # BLD: 4.17 M/UL — SIGNIFICANT CHANGE UP (ref 3.8–5.2)
RBC # FLD: 16.8 % — HIGH (ref 10.3–14.5)
RBC # FLD: 17.1 % — HIGH (ref 10.3–14.5)
RH IG SCN BLD-IMP: POSITIVE — SIGNIFICANT CHANGE UP
SARS-COV-2 IGG+IGM SERPL QL IA: 0.4 U/ML — SIGNIFICANT CHANGE UP
SARS-COV-2 IGG+IGM SERPL QL IA: NEGATIVE — SIGNIFICANT CHANGE UP
SARS-COV-2 RNA SPEC QL NAA+PROBE: SIGNIFICANT CHANGE UP
SODIUM SERPL-SCNC: 138 MMOL/L — SIGNIFICANT CHANGE UP (ref 135–145)
T PALLIDUM AB TITR SER: NEGATIVE — SIGNIFICANT CHANGE UP
URATE SERPL-MCNC: 3.4 MG/DL — SIGNIFICANT CHANGE UP (ref 2.5–7)
WBC # BLD: 11.99 K/UL — HIGH (ref 3.8–10.5)
WBC # BLD: 9.09 K/UL — SIGNIFICANT CHANGE UP (ref 3.8–10.5)
WBC # FLD AUTO: 11.99 K/UL — HIGH (ref 3.8–10.5)
WBC # FLD AUTO: 9.09 K/UL — SIGNIFICANT CHANGE UP (ref 3.8–10.5)

## 2021-04-23 PROCEDURE — 59410 OBSTETRICAL CARE: CPT | Mod: U9

## 2021-04-23 PROCEDURE — 88307 TISSUE EXAM BY PATHOLOGIST: CPT | Mod: 26

## 2021-04-23 RX ORDER — SODIUM CHLORIDE 9 MG/ML
250 INJECTION, SOLUTION INTRAVENOUS ONCE
Refills: 0 | Status: COMPLETED | OUTPATIENT
Start: 2021-04-23 | End: 2021-04-23

## 2021-04-23 RX ORDER — AMPICILLIN TRIHYDRATE 250 MG
1 CAPSULE ORAL EVERY 4 HOURS
Refills: 0 | Status: DISCONTINUED | OUTPATIENT
Start: 2021-04-23 | End: 2021-04-23

## 2021-04-23 RX ORDER — MAGNESIUM HYDROXIDE 400 MG/1
30 TABLET, CHEWABLE ORAL
Refills: 0 | Status: DISCONTINUED | OUTPATIENT
Start: 2021-04-23 | End: 2021-04-25

## 2021-04-23 RX ORDER — OXYCODONE HYDROCHLORIDE 5 MG/1
5 TABLET ORAL
Refills: 0 | Status: DISCONTINUED | OUTPATIENT
Start: 2021-04-23 | End: 2021-04-25

## 2021-04-23 RX ORDER — AMPICILLIN SODIUM AND SULBACTAM SODIUM 250; 125 MG/ML; MG/ML
3 INJECTION, POWDER, FOR SUSPENSION INTRAMUSCULAR; INTRAVENOUS EVERY 6 HOURS
Refills: 0 | Status: COMPLETED | OUTPATIENT
Start: 2021-04-23 | End: 2021-04-24

## 2021-04-23 RX ORDER — BENZOCAINE 10 %
1 GEL (GRAM) MUCOUS MEMBRANE EVERY 6 HOURS
Refills: 0 | Status: DISCONTINUED | OUTPATIENT
Start: 2021-04-23 | End: 2021-04-25

## 2021-04-23 RX ORDER — HYDROCORTISONE 1 %
1 OINTMENT (GRAM) TOPICAL EVERY 6 HOURS
Refills: 0 | Status: DISCONTINUED | OUTPATIENT
Start: 2021-04-23 | End: 2021-04-25

## 2021-04-23 RX ORDER — SIMETHICONE 80 MG/1
80 TABLET, CHEWABLE ORAL EVERY 4 HOURS
Refills: 0 | Status: DISCONTINUED | OUTPATIENT
Start: 2021-04-23 | End: 2021-04-25

## 2021-04-23 RX ORDER — TETANUS TOXOID, REDUCED DIPHTHERIA TOXOID AND ACELLULAR PERTUSSIS VACCINE, ADSORBED 5; 2.5; 8; 8; 2.5 [IU]/.5ML; [IU]/.5ML; UG/.5ML; UG/.5ML; UG/.5ML
0.5 SUSPENSION INTRAMUSCULAR ONCE
Refills: 0 | Status: DISCONTINUED | OUTPATIENT
Start: 2021-04-23 | End: 2021-04-25

## 2021-04-23 RX ORDER — SODIUM CHLORIDE 9 MG/ML
1000 INJECTION, SOLUTION INTRAVENOUS
Refills: 0 | Status: DISCONTINUED | OUTPATIENT
Start: 2021-04-23 | End: 2021-04-23

## 2021-04-23 RX ORDER — OXYCODONE HYDROCHLORIDE 5 MG/1
5 TABLET ORAL ONCE
Refills: 0 | Status: DISCONTINUED | OUTPATIENT
Start: 2021-04-23 | End: 2021-04-25

## 2021-04-23 RX ORDER — CITRIC ACID/SODIUM CITRATE 300-500 MG
15 SOLUTION, ORAL ORAL EVERY 6 HOURS
Refills: 0 | Status: DISCONTINUED | OUTPATIENT
Start: 2021-04-23 | End: 2021-04-23

## 2021-04-23 RX ORDER — PRAMOXINE HYDROCHLORIDE 150 MG/15G
1 AEROSOL, FOAM RECTAL EVERY 4 HOURS
Refills: 0 | Status: DISCONTINUED | OUTPATIENT
Start: 2021-04-23 | End: 2021-04-25

## 2021-04-23 RX ORDER — OXYTOCIN 10 UNIT/ML
333.33 VIAL (ML) INJECTION
Qty: 20 | Refills: 0 | Status: DISCONTINUED | OUTPATIENT
Start: 2021-04-23 | End: 2021-04-25

## 2021-04-23 RX ORDER — AMPICILLIN SODIUM AND SULBACTAM SODIUM 250; 125 MG/ML; MG/ML
3 INJECTION, POWDER, FOR SUSPENSION INTRAMUSCULAR; INTRAVENOUS EVERY 6 HOURS
Refills: 0 | Status: DISCONTINUED | OUTPATIENT
Start: 2021-04-23 | End: 2021-04-23

## 2021-04-23 RX ORDER — AER TRAVELER 0.5 G/1
1 SOLUTION RECTAL; TOPICAL EVERY 4 HOURS
Refills: 0 | Status: DISCONTINUED | OUTPATIENT
Start: 2021-04-23 | End: 2021-04-25

## 2021-04-23 RX ORDER — ACETAMINOPHEN 500 MG
975 TABLET ORAL
Refills: 0 | Status: DISCONTINUED | OUTPATIENT
Start: 2021-04-23 | End: 2021-04-25

## 2021-04-23 RX ORDER — DIBUCAINE 1 %
1 OINTMENT (GRAM) RECTAL EVERY 6 HOURS
Refills: 0 | Status: DISCONTINUED | OUTPATIENT
Start: 2021-04-23 | End: 2021-04-25

## 2021-04-23 RX ORDER — DIPHENHYDRAMINE HCL 50 MG
25 CAPSULE ORAL EVERY 6 HOURS
Refills: 0 | Status: DISCONTINUED | OUTPATIENT
Start: 2021-04-23 | End: 2021-04-25

## 2021-04-23 RX ORDER — LANOLIN
1 OINTMENT (GRAM) TOPICAL EVERY 6 HOURS
Refills: 0 | Status: DISCONTINUED | OUTPATIENT
Start: 2021-04-23 | End: 2021-04-25

## 2021-04-23 RX ORDER — IBUPROFEN 200 MG
600 TABLET ORAL EVERY 6 HOURS
Refills: 0 | Status: COMPLETED | OUTPATIENT
Start: 2021-04-23 | End: 2022-03-22

## 2021-04-23 RX ORDER — AMPICILLIN TRIHYDRATE 250 MG
2 CAPSULE ORAL ONCE
Refills: 0 | Status: COMPLETED | OUTPATIENT
Start: 2021-04-23 | End: 2021-04-23

## 2021-04-23 RX ORDER — OXYTOCIN 10 UNIT/ML
333.33 VIAL (ML) INJECTION
Qty: 20 | Refills: 0 | Status: DISCONTINUED | OUTPATIENT
Start: 2021-04-23 | End: 2021-04-23

## 2021-04-23 RX ORDER — KETOROLAC TROMETHAMINE 30 MG/ML
30 SYRINGE (ML) INJECTION ONCE
Refills: 0 | Status: DISCONTINUED | OUTPATIENT
Start: 2021-04-23 | End: 2021-04-23

## 2021-04-23 RX ORDER — FENTANYL/BUPIVACAINE/NS/PF 2MCG/ML-.1
250 PLASTIC BAG, INJECTION (ML) INJECTION
Refills: 0 | Status: DISCONTINUED | OUTPATIENT
Start: 2021-04-23 | End: 2021-04-23

## 2021-04-23 RX ORDER — IBUPROFEN 200 MG
600 TABLET ORAL EVERY 6 HOURS
Refills: 0 | Status: DISCONTINUED | OUTPATIENT
Start: 2021-04-23 | End: 2021-04-25

## 2021-04-23 RX ORDER — SODIUM CHLORIDE 9 MG/ML
3 INJECTION INTRAMUSCULAR; INTRAVENOUS; SUBCUTANEOUS EVERY 8 HOURS
Refills: 0 | Status: DISCONTINUED | OUTPATIENT
Start: 2021-04-23 | End: 2021-04-25

## 2021-04-23 RX ADMIN — SODIUM CHLORIDE 125 MILLILITER(S): 9 INJECTION, SOLUTION INTRAVENOUS at 09:40

## 2021-04-23 RX ADMIN — Medication 0.2 MILLIGRAM(S): at 13:45

## 2021-04-23 RX ADMIN — Medication 1000 MILLIUNIT(S)/MIN: at 15:15

## 2021-04-23 RX ADMIN — Medication 216 GRAM(S): at 09:42

## 2021-04-23 RX ADMIN — AMPICILLIN SODIUM AND SULBACTAM SODIUM 200 GRAM(S): 250; 125 INJECTION, POWDER, FOR SUSPENSION INTRAMUSCULAR; INTRAVENOUS at 15:00

## 2021-04-23 RX ADMIN — Medication 30 MILLIGRAM(S): at 13:00

## 2021-04-23 RX ADMIN — SODIUM CHLORIDE 125 MILLILITER(S): 9 INJECTION, SOLUTION INTRAVENOUS at 12:09

## 2021-04-23 RX ADMIN — SODIUM CHLORIDE 3 MILLILITER(S): 9 INJECTION INTRAMUSCULAR; INTRAVENOUS; SUBCUTANEOUS at 15:35

## 2021-04-23 RX ADMIN — AMPICILLIN SODIUM AND SULBACTAM SODIUM 200 GRAM(S): 250; 125 INJECTION, POWDER, FOR SUSPENSION INTRAMUSCULAR; INTRAVENOUS at 21:13

## 2021-04-23 RX ADMIN — Medication 30 MILLIGRAM(S): at 13:15

## 2021-04-23 RX ADMIN — SODIUM CHLORIDE 125 MILLILITER(S): 9 INJECTION, SOLUTION INTRAVENOUS at 10:24

## 2021-04-23 RX ADMIN — Medication 108 GRAM(S): at 12:52

## 2021-04-23 RX ADMIN — Medication 975 MILLIGRAM(S): at 18:30

## 2021-04-23 RX ADMIN — Medication 975 MILLIGRAM(S): at 19:00

## 2021-04-23 RX ADMIN — SODIUM CHLORIDE 3 MILLILITER(S): 9 INJECTION INTRAMUSCULAR; INTRAVENOUS; SUBCUTANEOUS at 22:00

## 2021-04-23 RX ADMIN — SODIUM CHLORIDE 250 MILLILITER(S): 9 INJECTION, SOLUTION INTRAVENOUS at 18:15

## 2021-04-23 RX ADMIN — Medication 250 MILLILITER(S): at 11:20

## 2021-04-23 NOTE — PATIENT PROFILE OB - FUNCTIONAL SCREEN CURRENT LEVEL: SWALLOWING (IF SCORE 2 OR MORE FOR ANY ITEM, CONSULT REHAB SERVICES), MLM)
Patient is calling again asking if he could speak to Sofy Kyle  He is stating that it is very important that he speak to someone about this today  0 = swallows foods/liquids without difficulty

## 2021-04-23 NOTE — PATIENT PROFILE OB - PRO VDRL INFANT
" Patient ID:  Jose Ptee is a 66 y.o. male who presents for follow-up of HFrEF      Pt has done well since last visit with me on 1/8/2018. He has undergone LHC that shows: (1) non-obstructive CAD and (2) left ventricular global hypokinesis with estimated EF 35-40% (Better than reported on prior echo).    PMH includes: HFrEF with diastolic dysfunction; hypertension; T2 DM on insulin     Social hx: he works in a box factory. He never smoked; used to drink 2 beers per day;     Family hx: he has no family hx of premature CAD.      Relevant information from my 1/8/2018 note:  Discharged on 11/12/2017 after a 3 day admission for dry cough, worsening shortness of breath on exertion and orthopnea. He ruled out for myocardial infarction and was diagnosed with HFrEF and diastolic dysfunction (BNP 1136 pg/ml).   Pt has no prior cardiac hx. He never had symptoms to suggest myocardial ischemia and heart failure. He was unaware of having poor cardiac function until the recent hospital admission. He works in a box factory and performs a physically demanding job that he could do well till the day he was admitted for heart failure."     Present medical therapy includes:  ASA 81 mg qd  Atorvastatin 40 mg qd  Carvedilol 6.25 mg bid  Lisinopril 20 mg qd  Furosemide 40 mg qd          Lab Results   Component Value Date     01/29/2018    K 5.1 01/29/2018     01/29/2018    CO2 27 01/29/2018    BUN 15 01/29/2018    CREATININE 0.9 01/29/2018     (H) 01/29/2018    HGBA1C 10.4 (H) 12/12/2017    AST 17 12/12/2017    ALT 25 12/12/2017    ALBUMIN 3.5 12/12/2017    PROT 7.5 12/12/2017    BILITOT 1.0 12/12/2017    WBC 6.96 01/29/2018    HGB 13.5 (L) 01/29/2018    HCT 39.8 (L) 01/29/2018    MCV 87 01/29/2018     01/29/2018    INR 1.0 01/29/2018    TSH 1.033 11/13/2017       Past Medical History:   Diagnosis Date    CHF (congestive heart failure)     Combined systolic and diastolic cardiac dysfunction 2005    " Hypertension, essential 11/12/2017    Pleural effusion 11/12/2017    treated with thoracentesis, appeared to be due to CHF    Proteinuria due to type 2 diabetes mellitus 2017    SOB (shortness of breath) 11/14/2017    Type 2 diabetes mellitus 2005    Type 2 diabetes mellitus with diabetic polyneuropathy, without long-term current use of insulin 12/12/2017     Family History   Problem Relation Age of Onset    Heart failure Mother     Emphysema Father     HIV Brother     Cancer Neg Hx      Social History     Social History    Marital status: Single     Spouse name: N/A    Number of children: N/A    Years of education: N/A     Occupational History    Not on file.     Social History Main Topics    Smoking status: Never Smoker    Smokeless tobacco: Never Used    Alcohol use No      Comment: hasn't drank since October 2017, previous 1-2 beers/day    Drug use: No    Sexual activity: Yes     Other Topics Concern    Not on file     Social History Narrative    No narrative on file       Lab Results   Component Value Date    CHOL 161 12/12/2017    HDL 39 (L) 12/12/2017    TRIG 77 12/12/2017       Lab Results   Component Value Date    LDLCALC 106.6 12/12/2017       Past Medical History:   Diagnosis Date    CHF (congestive heart failure)     Combined systolic and diastolic cardiac dysfunction 2005    Hypertension, essential 11/12/2017    Pleural effusion 11/12/2017    treated with thoracentesis, appeared to be due to CHF    Proteinuria due to type 2 diabetes mellitus 2017    SOB (shortness of breath) 11/14/2017    Type 2 diabetes mellitus 2005    Type 2 diabetes mellitus with diabetic polyneuropathy, without long-term current use of insulin 12/12/2017     Hypertension Medications             carvedilol (COREG) 3.125 MG tablet Take 2 tablets (6.25 mg total) by mouth 2 (two) times daily.    furosemide (LASIX) 40 MG tablet Take 1 tablet (40 mg total) by mouth once daily.    lisinopril 10 MG tablet Take 2  "tablets (20 mg total) by mouth once daily.            Review of Systems   Constitution: Positive for weakness. Negative for decreased appetite, diaphoresis, fever, malaise/fatigue, weight gain and weight loss.   HENT: Negative for congestion, ear discharge, ear pain and nosebleeds.    Eyes: Negative for blurred vision, double vision and visual disturbance.   Cardiovascular: Positive for dyspnea on exertion. Negative for chest pain, claudication, cyanosis, irregular heartbeat, leg swelling, near-syncope, orthopnea, palpitations, paroxysmal nocturnal dyspnea and syncope.   Respiratory: Negative for cough, hemoptysis, shortness of breath, sleep disturbances due to breathing, snoring, sputum production and wheezing.    Endocrine: Negative for polydipsia, polyphagia and polyuria.   Hematologic/Lymphatic: Negative for adenopathy and bleeding problem. Does not bruise/bleed easily.   Skin: Negative for color change, nail changes, poor wound healing and rash.   Musculoskeletal: Negative for muscle cramps and muscle weakness.   Gastrointestinal: Negative for abdominal pain, anorexia, change in bowel habit, hematochezia, nausea and vomiting.   Genitourinary: Negative for dysuria, frequency and hematuria.   Neurological: Negative for brief paralysis, difficulty with concentration, excessive daytime sleepiness, dizziness, focal weakness, headaches, light-headedness, seizures and vertigo.   Psychiatric/Behavioral: Negative for altered mental status and depression.   Allergic/Immunologic: Negative for persistent infections.                Objective:         BP (!) 150/72 (BP Location: Left arm, Patient Position: Sitting, BP Method: Medium (Automatic))   Pulse 79   Ht 6' 2" (1.88 m)   Wt 75.7 kg (166 lb 14.2 oz)   BMI 21.43 kg/m²    Physical Exam   Constitutional: He is oriented to person, place, and time. He appears well-developed and well-nourished.   HENT:   Head: Normocephalic.   Right Ear: External ear normal.   Left Ear: " External ear normal.   Nose: Nose normal.   Inspection of lips, teeth and gums normal   Eyes: EOM are normal. Pupils are equal, round, and reactive to light. No scleral icterus.   Neck: Normal range of motion. Neck supple. No JVD present. No tracheal deviation present. No thyromegaly present.   Cardiovascular: Normal rate, regular rhythm, S1 normal, S2 normal, normal heart sounds and intact distal pulses.  Exam reveals no gallop and no friction rub.    No murmur heard.  Pulses:       Carotid pulses are 2+ on the right side, and 2+ on the left side.       Radial pulses are 2+ on the right side, and 2+ on the left side.        Femoral pulses are 2+ on the right side, and 2+ on the left side.       Dorsalis pedis pulses are 2+ on the right side, and 2+ on the left side.        Posterior tibial pulses are 2+ on the right side, and 2+ on the left side.   Pulmonary/Chest: Effort normal and breath sounds normal.   Abdominal: Bowel sounds are normal. He exhibits no distension. There is no hepatosplenomegaly. There is no tenderness. There is no guarding.   Musculoskeletal: Normal range of motion. He exhibits no edema or tenderness.   Lymphadenopathy:   Palpation of neck and groin lymph nodes normal   Neurological: He is alert and oriented to person, place, and time. No cranial nerve deficit. He exhibits normal muscle tone. Coordination normal.   Skin: Skin is dry.   No ankle nor pretibial edema.   Psychiatric: His behavior is normal. Judgment and thought content normal.           ECG (29-JAN-2018)  Normal sinus rhythm  Possible Left atrial enlargement  T wave abnormality, consider lateral ischemia  Abnormal ECG  When compared with ECG of 29-JAN-2018 06:09,  No significant change was found           Echo (11/13/2017)    TEST DESCRIPTION   Technical Quality: This is a technically adequate study. This study was performed in conjunction with a 3ml intravenous injection of Optison contrast agent.     Aorta: The aortic root is  normal in size, measuring 3.5 cm at sinotubular junction and 3.8 cm at Sinuses of Valsalva. The proximal ascending aorta is normal in size, measuring 3.5 cm across.     Left Atrium: The left atrial volume index is severely enlarged, measuring 52.97 cc/m2.     Left Ventricle: The left ventricle is mildly enlarged, with an end-diastolic diameter of 6.3 cm, and an end-systolic diameter of 5.7 cm. LV wall thickness is normal, with the septum and the posterior wall each measuring 1.0 cm across. Relative wall   thickness was normal at 0.32, and the LV mass index was increased at 156.6 g/m2 consistent with severe eccentric left ventricular hypertrophy. There is global hypokinesis. Left ventricular systolic function appears severely depressed. Visually estimated   ejection fraction is 25-30%. The LV Doppler derived stroke volume equals 43.0 ccs.     Diastolic indices: E wave velocity 0.8 m/s, E/A ratio 2.0,  msec., E/e' ratio(avg) 14. Pulmonary vein systolic/diastolic ratio is blunted. Diastolic function is indeterminate.     Right Atrium: The right atrium is normal in size, measuring 4.0 cm in length and 3.7 cm in width in the apical view.     Right Ventricle: The right ventricle is mildly enlarged measuring 4.1 cm at the base in the apical right ventricle-focused view. Global right ventricular systolic function appears low normal to mildly depressed. Tricuspid annular plane systolic excursion   (TAPSE) is 1.9 cm.     Aortic Valve:  The aortic valve is mildly sclerotic with normal leaflet mobility. The aortic valve is tri-leaflet in structure.     Mitral Valve:  The mitral valve is mildly sclerotic with normal leaflet mobility.     Tricuspid Valve:  The tricuspid valve is normal in structure with normal leaflet mobility.     Pulmonary Valve:  The pulmonic valve is not well seen.     Pericardium: There is evidence of a small circumferential pericardial effusion.     IVC: IVC is normal in size and collapses > 50% with  a sniff, suggesting normal right atrial pressure of 3 mmHg.     Intracavitary: There is no evidence of intracavity mass, thrombi, or vegetation.     Other: There is a right pleural effusion present. If clinically indicated, a full Doppler study can be performed.     CONCLUSIONS     1 - Eccentric LVH with severely depressed left ventricular systolic function (EF 25-30%).     2 - Severe left atrial enlargement.     3 - There is diastolic dysfunction present the stage is Indeterminate.     4 - Right ventricular enlargement with low normal to mildly depressed systolic function.     5 - Right pleural effusion.     6 - Small pericardial effusion.     7 - There is AI noted on limited images.     This document has been electronically    SIGNED BY: Ami Lai MD              Regadenoson Nuclear Stress Test (11/15/2017)    PRE-TEST DATA   EKG: Resting electrocardiogram reveals normal sinus rhythm at a rate of 82 bpm. There was right axis deviation, right ventricular hypertrophy or enlargement, and repolarization abnormality secondary to ventricular hypertrophy.     Indication for Stress Test:  decreased LVEF    Clinical Notes:  Hypertension,  Diabetes and Cardiomyopathy    TEST DESCRIPTION   The patient received 0.4 mg of Regadenoson as an IV bolus. Peak heart rate was 84 bpm, which is 55% of the age predicted maximum heart rate. .     EKG Conclusions:    1. The EKG portion of this study is abnormal but non diagnostic for ischemia at a peak heart rate of 84 bpm (55% of predicted).   2. Specificity is reduced secondary to resting ST segment changes.   3. Blood pressure remained stable throughout the protocol  (Presenting BP: 130/86 Peak BP: 133/83).   4. No significant arrhythmias were present.   5. There were no symptoms of chest discomfort or significant dyspnea throughout the protocol.     Nuclear Procedure:  Following a single isotope protocol, 9.8 mCi of Tc99 labeled Tetrofosmin was given at rest and tomographic  imaging was performed. Regadenoson pharmacologic stress testing was performed as described above. Immediately following the IV bolus of   regadenoson, 30 mCi of Tc99 labeled Tetrofosmin was given and tomographic imaging was performed. The site of the IV injection was the left AC. Images were obtained on a Siemens C-Cam camera.     Comments:  This is a technically excellent study. Inspection of the transaxial images demonstrated no significant cranial, caudal, or lateral patient motion in the camera between rest and stress acquisitions. On stress images, there is a mild slit-like defect seen   in the anteroseptal wall of the left ventricle, which correspond with the RV insertion site.  The remainder of the myocardium demonstrates normal radiotracer uptake. The extracardiac distribution of radioactivity is normal. The left ventricular cavity is   increased in size at rest and does not dilate further with stress. The left ventricular wall motion is abnormal at rest showing severe global hypokinesis of the left ventricle.     Nuclear Quantitative Functional Analysis:   LVEF: 22 % (normal is >= 51%)  LVED Volume: 184 ml (normal is <=171)  LVES Volume: 144 ml (normal is <=70)    Impression: NORMAL MYOCARDIAL PERFUSION  1. The perfusion scan is free of evidence for myocardial ischemia or injury.   2. There is a small mild intensity defect in the anteroseptal wall consistent with the RV insertion site.   3. There is abnormal wall motion at rest showing severe global hypokinesis of the left ventricle.   4. There is resting LV dysfunction with a reduced ejection fraction of 22 %.  (normal is >= 51%)  5. The left ventricular volume is mildly increased at rest.   6. The extracardiac distribution of radioactivity is normal. There are bilateral pleural effusions (R>L)    This document has been electronically    SIGNED BY: Manish Arreguin MD           Left heart Cath (1/29/2018)    A. Indication/Pre-Operative Diagnosis     The  patient is a 66 year old male that was referred for catheterization by Keegan Velez for cardiomypathy or decreased LV function.     B. Summary/Post-Operative Diagnosis       Non-obstructive CAD.    Left ventricular global hypokinesis with estimated EF 35-40% (Better than reported on prior echo).    D. Hemodynamic Results     Ejection Fraction: 35%  LV: 119/0  LVEDP: 4       E. Angiographic Results     Diagnostic:          Patient has a left dominant coronary artery.        The coronary vessels have luminal irregularities.        - Left Main Coronary Artery:             The LM has luminal irregularities. There is DANIELA 3 flow.     - Left Anterior Descending Artery:             The LAD has a 50% stenosis. There is DANIELA 3 flow. FFR was 0.83. IFR was 0.91.     - Left Circumflex Artery:             The LCX has luminal irregularities. There is DANIELA 3 flow.     - Right Coronary Artery:             The RCA has luminal irregularities. There is DANIELA 3 flow.    G. Recommendations     1. Maximize medical management.  2. Statin therapy.    I certify that I was present for catheter insertion, catheter manipulation, angiography, and angiographic interpretation of this patient.     This document has been electronically    SIGNED BY: Stone Escudero MD           I have reviewed the following:     Details / Date    []   Labs     []   Imaging     []   Cardiology Procedures     []   Other      Assessment and Plan:       1. Chronic systolic heart failure    2. Hypertension, essential         HFrEF (heart failure with reduced ejection fraction)  Clinically stable. LHC shows non-obstructive CAD and LVEF 35-40% (better than previously reported by echo).     Repeat echocardiogram and if LVEF >35% no referral for ICD  Increase carvedilol to 12.5 mg bid  Continue Lisinopril 20 mg qd  Continue Furosemide 40 mg qd  RTC in 3 months        Hypertension, essential  Unclear how well controlled BP us at home. Today in clinic BP  150/72 but he was upset because of problems with his medical insurance      Home BP monitoring as instructed (tid for 5 days) starting in 7-10 days   Increase carvedilol to 12.5 mg bid  Continue lisinopril 20 mg qd            negative

## 2021-04-24 LAB
HCT VFR BLD CALC: 24.6 % — LOW (ref 34.5–45)
HGB BLD-MCNC: 7.8 G/DL — LOW (ref 11.5–15.5)
MCHC RBC-ENTMCNC: 27.3 PG — SIGNIFICANT CHANGE UP (ref 27–34)
MCHC RBC-ENTMCNC: 31.7 GM/DL — LOW (ref 32–36)
MCV RBC AUTO: 86 FL — SIGNIFICANT CHANGE UP (ref 80–100)
NRBC # BLD: 0 /100 WBCS — SIGNIFICANT CHANGE UP (ref 0–0)
PLATELET # BLD AUTO: 143 K/UL — LOW (ref 150–400)
RBC # BLD: 2.86 M/UL — LOW (ref 3.8–5.2)
RBC # FLD: 17.5 % — HIGH (ref 10.3–14.5)
WBC # BLD: 10.7 K/UL — HIGH (ref 3.8–10.5)
WBC # FLD AUTO: 10.7 K/UL — HIGH (ref 3.8–10.5)

## 2021-04-24 RX ADMIN — Medication 600 MILLIGRAM(S): at 01:25

## 2021-04-24 RX ADMIN — Medication 1 TABLET(S): at 12:32

## 2021-04-24 RX ADMIN — Medication 600 MILLIGRAM(S): at 18:17

## 2021-04-24 RX ADMIN — Medication 600 MILLIGRAM(S): at 06:31

## 2021-04-24 RX ADMIN — Medication 975 MILLIGRAM(S): at 09:40

## 2021-04-24 RX ADMIN — Medication 975 MILLIGRAM(S): at 16:00

## 2021-04-24 RX ADMIN — Medication 975 MILLIGRAM(S): at 22:50

## 2021-04-24 RX ADMIN — Medication 600 MILLIGRAM(S): at 12:26

## 2021-04-24 RX ADMIN — SODIUM CHLORIDE 3 MILLILITER(S): 9 INJECTION INTRAMUSCULAR; INTRAVENOUS; SUBCUTANEOUS at 22:00

## 2021-04-24 RX ADMIN — Medication 975 MILLIGRAM(S): at 15:44

## 2021-04-24 RX ADMIN — Medication 600 MILLIGRAM(S): at 12:35

## 2021-04-24 RX ADMIN — Medication 600 MILLIGRAM(S): at 18:33

## 2021-04-24 RX ADMIN — AMPICILLIN SODIUM AND SULBACTAM SODIUM 200 GRAM(S): 250; 125 INJECTION, POWDER, FOR SUSPENSION INTRAMUSCULAR; INTRAVENOUS at 10:02

## 2021-04-24 RX ADMIN — AMPICILLIN SODIUM AND SULBACTAM SODIUM 200 GRAM(S): 250; 125 INJECTION, POWDER, FOR SUSPENSION INTRAMUSCULAR; INTRAVENOUS at 03:40

## 2021-04-24 RX ADMIN — Medication 1 APPLICATION(S): at 00:28

## 2021-04-24 RX ADMIN — Medication 975 MILLIGRAM(S): at 21:55

## 2021-04-24 RX ADMIN — Medication 1 SPRAY(S): at 00:28

## 2021-04-24 RX ADMIN — SODIUM CHLORIDE 3 MILLILITER(S): 9 INJECTION INTRAMUSCULAR; INTRAVENOUS; SUBCUTANEOUS at 06:00

## 2021-04-24 RX ADMIN — Medication 600 MILLIGRAM(S): at 00:26

## 2021-04-24 RX ADMIN — Medication 600 MILLIGRAM(S): at 07:16

## 2021-04-24 RX ADMIN — Medication 975 MILLIGRAM(S): at 09:53

## 2021-04-24 NOTE — LACTATION INITIAL EVALUATION - NS LACT CON REASON FOR REQ
multipara declined consult overnight and now primary RN reports is bfing well with no concerns or complaints. baby is jaimie +, mom has h/o retained placenta with D&C and PPH. baby voiding and stooling well. primary RN to alert LC if patient desires consult./multiparous mom

## 2021-04-25 ENCOUNTER — TRANSCRIPTION ENCOUNTER (OUTPATIENT)
Age: 31
End: 2021-04-25

## 2021-04-25 VITALS
OXYGEN SATURATION: 99 % | DIASTOLIC BLOOD PRESSURE: 72 MMHG | RESPIRATION RATE: 18 BRPM | TEMPERATURE: 98 F | HEART RATE: 76 BPM | SYSTOLIC BLOOD PRESSURE: 117 MMHG

## 2021-04-25 LAB
HCT VFR BLD CALC: 24.8 % — LOW (ref 34.5–45)
HGB BLD-MCNC: 7.8 G/DL — LOW (ref 11.5–15.5)
MCHC RBC-ENTMCNC: 27 PG — SIGNIFICANT CHANGE UP (ref 27–34)
MCHC RBC-ENTMCNC: 31.5 GM/DL — LOW (ref 32–36)
MCV RBC AUTO: 85.8 FL — SIGNIFICANT CHANGE UP (ref 80–100)
NRBC # BLD: 0 /100 WBCS — SIGNIFICANT CHANGE UP (ref 0–0)
PLATELET # BLD AUTO: 141 K/UL — LOW (ref 150–400)
RBC # BLD: 2.89 M/UL — LOW (ref 3.8–5.2)
RBC # FLD: 17.7 % — HIGH (ref 10.3–14.5)
WBC # BLD: 9.57 K/UL — SIGNIFICANT CHANGE UP (ref 3.8–10.5)
WBC # FLD AUTO: 9.57 K/UL — SIGNIFICANT CHANGE UP (ref 3.8–10.5)

## 2021-04-25 PROCEDURE — 82570 ASSAY OF URINE CREATININE: CPT

## 2021-04-25 PROCEDURE — 85730 THROMBOPLASTIN TIME PARTIAL: CPT

## 2021-04-25 PROCEDURE — 85610 PROTHROMBIN TIME: CPT

## 2021-04-25 PROCEDURE — 80053 COMPREHEN METABOLIC PANEL: CPT

## 2021-04-25 PROCEDURE — 85027 COMPLETE CBC AUTOMATED: CPT

## 2021-04-25 PROCEDURE — 86901 BLOOD TYPING SEROLOGIC RH(D): CPT

## 2021-04-25 PROCEDURE — 84550 ASSAY OF BLOOD/URIC ACID: CPT

## 2021-04-25 PROCEDURE — 59050 FETAL MONITOR W/REPORT: CPT

## 2021-04-25 PROCEDURE — U0003: CPT

## 2021-04-25 PROCEDURE — 85025 COMPLETE CBC W/AUTO DIFF WBC: CPT

## 2021-04-25 PROCEDURE — 83615 LACTATE (LD) (LDH) ENZYME: CPT

## 2021-04-25 PROCEDURE — 86769 SARS-COV-2 COVID-19 ANTIBODY: CPT

## 2021-04-25 PROCEDURE — 88307 TISSUE EXAM BY PATHOLOGIST: CPT

## 2021-04-25 PROCEDURE — 36415 COLL VENOUS BLD VENIPUNCTURE: CPT

## 2021-04-25 PROCEDURE — U0005: CPT

## 2021-04-25 PROCEDURE — 86780 TREPONEMA PALLIDUM: CPT

## 2021-04-25 PROCEDURE — 84156 ASSAY OF PROTEIN URINE: CPT

## 2021-04-25 PROCEDURE — 86850 RBC ANTIBODY SCREEN: CPT

## 2021-04-25 PROCEDURE — 86900 BLOOD TYPING SEROLOGIC ABO: CPT

## 2021-04-25 PROCEDURE — 85384 FIBRINOGEN ACTIVITY: CPT

## 2021-04-25 RX ADMIN — Medication 600 MILLIGRAM(S): at 01:00

## 2021-04-25 RX ADMIN — Medication 975 MILLIGRAM(S): at 04:00

## 2021-04-25 RX ADMIN — SODIUM CHLORIDE 3 MILLILITER(S): 9 INJECTION INTRAMUSCULAR; INTRAVENOUS; SUBCUTANEOUS at 06:00

## 2021-04-25 RX ADMIN — Medication 600 MILLIGRAM(S): at 13:05

## 2021-04-25 RX ADMIN — Medication 600 MILLIGRAM(S): at 00:04

## 2021-04-25 RX ADMIN — Medication 975 MILLIGRAM(S): at 02:59

## 2021-04-25 RX ADMIN — Medication 975 MILLIGRAM(S): at 09:29

## 2021-04-25 RX ADMIN — Medication 600 MILLIGRAM(S): at 06:32

## 2021-04-25 RX ADMIN — Medication 975 MILLIGRAM(S): at 10:00

## 2021-04-25 RX ADMIN — Medication 1 TABLET(S): at 13:04

## 2021-04-25 RX ADMIN — Medication 600 MILLIGRAM(S): at 07:13

## 2021-04-25 NOTE — DISCHARGE NOTE OB - MEDICATION SUMMARY - MEDICATIONS TO STOP TAKING
Problem: Oxygenation/Respiratory Function  Goal: Optimized air exchange    Intervention: Assess respiratory rate, effort, breathing pattern and oxygenation  Infant remains stable on Bubble NCPAP 5cm H2O at 26-30% FiO2 this shift. No A/Bs noted this shift.      Problem: Hyperbilirubinemia  Goal: Safe administration of phototherapy    Intervention: Bili mask in place and secure  Bilimask in place to protect eyes and checked frequently. Infant repositioned q3h to expose maximum body surface, head remains midline.       Problem: Nutrition/Feeding  Goal: Tolerating transition to enteral feedings  Outcome: PROGRESSING AS EXPECTED    Intervention: Gavage feeding per feeding tube guidelines. Offer pacifier wtih gavage feeds.  Infant receiving MBM 20cal: 6ml q3h OG.  Head up bed up, pacifier offered with feeds         I will STOP taking the medications listed below when I get home from the hospital:  None

## 2021-04-25 NOTE — DISCHARGE NOTE OB - CARE PROVIDER_API CALL
Umair Oneill)  Obstetrics and Gynecology  [address], [facility], [place] [address]  Phone: [phone]  Fax: [phone]  Follow Up Time:

## 2021-04-25 NOTE — DISCHARGE NOTE OB - HOSPITAL COURSE
Patient had vaginal delivery complicated by retained placenta requiring manual removal.  Please see delivery note for details.  During postpartum course patient's vitals were stable, vaginal bleeding appropriate, and pain well controlled.  On day of discharge patient was ambulating, her pain controlled with oral medications, had adequate oral intake, and was voiding freely.  Discharge instructions and precautions were given.  Will return to clinic in 6 weeks for postpartum visit.

## 2021-04-25 NOTE — DISCHARGE NOTE OB - MATERIALS PROVIDED
Vaccinations/[facility]  Screening Program/  Immunization Record/Breastfeeding Log/Bottle Feeding Log/Guide to Postpartum Care/[facility] Hearing Screen Program/Back To Sleep Handout/Shaken Baby Prevention Handout/Breastfeeding Guide and Packet/Birth Certificate Instructions/Tdap Vaccination (VIS Pub Date: 2012)

## 2021-04-25 NOTE — PROGRESS NOTE ADULT - ASSESSMENT
A/P 31yoF s/p , now PP2/HD2 s/p D&C for manual removal of placenta, now with normal postpartum course. Vital signs stable.  - D&C: with PPH s/p IM methergine, s/p Unasyn x 24hrs   - Continue routine postpartum care  - Pain: well controlled on oral pain medication  - GI: tolerating regular diet  - : voiding without difficulty  - DVT ppx: ambulating without assistance, no SCDs required at this time  - Dispo: PP2 
A/P 31yoF s/p , now PP1/HD1 s/p D&C for manual removal of placenta, now with normal postpartum course. Vital signs stable.  - D&C: s/p IM methergine, on IV Unasyn  - Continue routine postpartum care  - Pain: well controlled on oral pain medication  - GI: tolerating regular diet  - : voiding without difficulty  - DVT ppx: ambulating without assistance, no SCDs required at this time  - Dispo: PP2 or when pt feels ready

## 2021-04-25 NOTE — PROGRESS NOTE ADULT - SUBJECTIVE AND OBJECTIVE BOX
Pt seen and evaluated at bedside this morning. No overnight events. Pt is resting comfortably in bed and reports her pain is well controlled. Pt reports some perineal discomfort and cramping but denies heavy vaginal bleeding/clots. Pt is ambulating without assistance. Pt is eating/drinking w/o N/V. +voiding +flatus -bm. Pt denies HA, dizziness/lightheadedness, weakness, changes in vision, leg swelling, chest pain, shortness of breath, or fevers/chills.    Physical Exam:  Vital Signs Last 24 Hrs  T(C): 36.8 (24 Apr 2021 05:41), Max: 36.8 (24 Apr 2021 05:41)  T(F): 98.3 (24 Apr 2021 05:41), Max: 98.3 (24 Apr 2021 05:41)  HR: 81 (24 Apr 2021 05:41) (62 - 100)  BP: 96/60 (24 Apr 2021 05:41) (96/60 - 136/73)  BP(mean): 96 (24 Apr 2021 05:41) (96 - 96)  RR: 19 (24 Apr 2021 05:41) (12 - 20)  SpO2: 100% (24 Apr 2021 05:41) (98% - 100%)    Gen: NAD, A&0x3  Pulm: no increased WOB  Abd: soft, appropriately tended to palpation, nondistended, no rebound or guarding, uterus firm at midline under the umbilicus  : minimal lochia noted on pad  Extremities: no edema or calf tenderness to palpation                          8.9    11.99 )-----------( 117      ( 23 Apr 2021 18:01 )             27.4     04-23    138  |  105  |  7   ----------------------------<  88  3.7   |  21<L>  |  0.44<L>    Ca    8.6      23 Apr 2021 10:09    TPro  7.6  /  Alb  3.5  /  TBili  0.5  /  DBili  x   /  AST  20  /  ALT  8<L>  /  AlkPhos  235<H>  04-23    PT/INR - ( 23 Apr 2021 18:01 )   PT: 12.5 sec;   INR: 1.04          PTT - ( 23 Apr 2021 18:01 )  PTT:27.7 sec    
Patient evaluated at bedside.  No acute events overnight.    She reports pain is well controlled with medications.     She has been ambulating without assistance and is voiding spontaneously. Reports decrease in vaginal bleeding and denies clots.     She denies HA, dizziness, chest pain, palpitations, shortness of breath, n/v, heavy vaginal bleeding or perineal discomfort.    Physical Exam:  Vital Signs Last 24 Hrs  T(C): 36.7 (24 Apr 2021 21:58), Max: 36.8 (24 Apr 2021 09:37)  T(F): 98.1 (24 Apr 2021 21:58), Max: 98.3 (24 Apr 2021 09:37)  HR: 83 (24 Apr 2021 21:58) (76 - 83)  BP: 117/73 (24 Apr 2021 21:58) (109/71 - 122/76)  BP(mean): --  RR: 18 (24 Apr 2021 21:58) (18 - 20)  SpO2: 98% (24 Apr 2021 21:58) (98% - 100%)    GA: NAD, A+0 x 3  Abd: + BS, soft, appropriately tender, nondistended, no rebound or guarding, uterus firm at midline  : lochia WNL  Extremities: no edema or calf tenderness                          7.8    10.70 )-----------( 143      ( 24 Apr 2021 14:39 )             24.6     04-23    138  |  105  |  7   ----------------------------<  88  3.7   |  21<L>  |  0.44<L>    Ca    8.6      23 Apr 2021 10:09    TPro  7.6  /  Alb  3.5  /  TBili  0.5  /  DBili  x   /  AST  20  /  ALT  8<L>  /  AlkPhos  235<H>  04-23    PT/INR - ( 23 Apr 2021 18:01 )   PT: 12.5 sec;   INR: 1.04          PTT - ( 23 Apr 2021 18:01 )  PTT:27.7 sec

## 2021-04-27 ENCOUNTER — APPOINTMENT (OUTPATIENT)
Dept: OBGYN | Facility: CLINIC | Age: 31
End: 2021-04-27

## 2021-04-28 LAB — SURGICAL PATHOLOGY STUDY: SIGNIFICANT CHANGE UP

## 2021-04-29 DIAGNOSIS — Z3A.39 39 WEEKS GESTATION OF PREGNANCY: ICD-10-CM

## 2021-04-29 DIAGNOSIS — Z34.83 ENCOUNTER FOR SUPERVISION OF OTHER NORMAL PREGNANCY, THIRD TRIMESTER: ICD-10-CM

## 2021-05-03 ENCOUNTER — APPOINTMENT (OUTPATIENT)
Dept: OBGYN | Facility: CLINIC | Age: 31
End: 2021-05-03
Payer: MEDICAID

## 2021-05-03 ENCOUNTER — NON-APPOINTMENT (OUTPATIENT)
Age: 31
End: 2021-05-03

## 2021-05-03 VITALS — SYSTOLIC BLOOD PRESSURE: 100 MMHG | DIASTOLIC BLOOD PRESSURE: 60 MMHG

## 2021-05-03 DIAGNOSIS — Z34.90 ENCOUNTER FOR SUPERVISION OF NORMAL PREGNANCY, UNSPECIFIED, UNSPECIFIED TRIMESTER: ICD-10-CM

## 2021-05-03 DIAGNOSIS — O99.820 STREPTOCOCCUS B CARRIER STATE COMPLICATING PREGNANCY: ICD-10-CM

## 2021-05-03 PROCEDURE — 0503F POSTPARTUM CARE VISIT: CPT

## 2021-05-03 NOTE — HISTORY OF PRESENT ILLNESS
[Postpartum Follow Up] : postpartum follow up [Delivery Date: ___] : on [unfilled] [Female] : Delivery History: baby girl [Breastfeeding] : currently nursing [Complications:___] : no complications [] : delivered by vaginal delivery [S/Sx PP Depression] : no signs/symptoms of postpartum depression [Erythema] : not erythematous [Back to Normal] : is back to normal in size [Mild] : mild vaginal bleeding [Normal] : the vagina was normal [Cervix Sample Taken] : cervical sample not taken for a Pap smear [Not Done] : Examination of breasts not done [Doing Well] : is doing well [No Sign of Infection] : is showing no signs of infection [Excellent Pain Control] : has excellent pain control [None] : None [FreeTextEntry8] : Follow up post partum visit for lower abdominal pain.

## 2021-06-01 ENCOUNTER — APPOINTMENT (OUTPATIENT)
Dept: OBGYN | Facility: CLINIC | Age: 31
End: 2021-06-01
Payer: MEDICAID

## 2021-06-01 ENCOUNTER — NON-APPOINTMENT (OUTPATIENT)
Age: 31
End: 2021-06-01

## 2021-06-01 VITALS — SYSTOLIC BLOOD PRESSURE: 100 MMHG | BODY MASS INDEX: 22.96 KG/M2 | DIASTOLIC BLOOD PRESSURE: 60 MMHG | WEIGHT: 138 LBS

## 2021-06-01 PROCEDURE — 0503F POSTPARTUM CARE VISIT: CPT

## 2021-06-01 NOTE — HISTORY OF PRESENT ILLNESS
[Postpartum Follow Up] : postpartum follow up [Delivery Date: ___] : on [unfilled] [Female] : Delivery History: baby girl [Breastfeeding] : currently nursing [] : delivered by vaginal delivery [BF with Difficulty] : nursing without difficulty [Back to Normal] : is back to normal in size [Normal] : the vagina was normal [Healing Well] : is healing well [Cervix Sample Taken] : cervical sample not taken for a Pap smear [Not Done] : Examination of breasts not done [Doing Well] : is doing well [No Sign of Infection] : is showing no signs of infection [Excellent Pain Control] : has excellent pain control [None] : None [FreeTextEntry8] : Follow up six weeks post partum exam. [de-identified] : would like to initiate minipill for contraception, pt will call to update pharmacy. all questions answered rto 6 mo

## 2021-07-02 RX ORDER — NORETHINDRONE 0.35 MG/1
0.35 TABLET ORAL
Qty: 84 | Refills: 3 | Status: ACTIVE | COMMUNITY
Start: 2021-07-02 | End: 1900-01-01

## 2022-02-02 NOTE — PATIENT PROFILE OB - RUPTURE OF MEMBRANES_DATE TIME
RN returned call and spoke with patient. See other telephone encounter. Refill request routed to provider on 1/28/22. Writer informed patient that her refill request has been routed to the provider for approval. Patient verbalized understanding.       Dang Barrett RN    
Writer contacted patient's pharmacy that the [facility] prescription was e-prescribed to. Pharmacist confirmed that patient was only sold 7 days worth of Norco with a quantity of 21 tablets. Writer also confirmed on . Pharmacy verbalized that at the beginning of the year, a lot of insurances only allow for 1 week worth of medication initially. Pharmacist reported patient shouldn't need a PA, that if the provider sends over another prescription with the remainder of 23 days (69 tablets), patient shouldn't have a problem with insurance covering this. Writer routing message to provider.       Dang Barrett, RN    
27-Oct-2019 11:00
